# Patient Record
Sex: MALE | Race: BLACK OR AFRICAN AMERICAN
[De-identification: names, ages, dates, MRNs, and addresses within clinical notes are randomized per-mention and may not be internally consistent; named-entity substitution may affect disease eponyms.]

---

## 2017-01-21 ENCOUNTER — HOSPITAL ENCOUNTER (EMERGENCY)
Dept: HOSPITAL 75 - ER | Age: 32
Discharge: LEFT BEFORE DISCHARGE | End: 2017-01-21
Payer: SELF-PAY

## 2017-01-21 VITALS — WEIGHT: 190 LBS | BODY MASS INDEX: 29.82 KG/M2 | HEIGHT: 67 IN

## 2017-01-21 VITALS — DIASTOLIC BLOOD PRESSURE: 122 MMHG | SYSTOLIC BLOOD PRESSURE: 149 MMHG

## 2017-01-21 DIAGNOSIS — Z53.21: ICD-10-CM

## 2017-01-21 DIAGNOSIS — R07.89: Primary | ICD-10-CM

## 2017-01-21 PROCEDURE — 99281 EMR DPT VST MAYX REQ PHY/QHP: CPT

## 2017-01-24 NOTE — ED CHEST PAIN
General


Chief Complaint:  Chest Wall/Rib Pain


Stated Complaint:  SOA/ABD AND CHEST PAIN


Nursing Triage Note:  


Amb to Rm 7 leaning on significant other, crying anf refusing to answer 


questions over girlfriend.  c/o pain in rib area.  Girlfriend states he has 

been 


coughing over last 3 days.


Nursing Sepsis Screen:  No Definite Risk


Source:  patient (EXTREMELY DIFFICULT HISTORIAN AND INITIALLY REFUSED TO ANSWER 

ANY QUESTIONS AT ALL), other (GIRLFRIEND--VERY LIMITED HISTORIAN--CAN GIVE NO 

RELEVANT INFORMATION)





History of Present Illness


Time seen by provider:  17:58


Initial Comments


PT C/O PAIN TO LEFT LOWER RIB AREA SINCE YESTERDAY, WORSE TODAY


HURTS TO BREATHE


HAS HAD PRODUCTIVE COUGH WITH YELLOW SPUTUM SINCE THURSDAY  NIGHT 01/19/17


UNKNOWN IF HE HAS HAD FEVER OR NOT


HAS TAKEN NOTHING FOR SYMPTOMS





NO OTHER INFORMATION IS OBTAINABLE FROM PT





Allergies and Home Medications


Allergies


Coded Allergies:  


     No Known Drug Allergies (Unverified , 3/23/16)





Review of Systems


Constitutional:   other (MINIMAL INFORMATION OBTAINED FROM PT)


Respiratory:   Cough


Cardiovascular:   Chest Pain





Past Medical-Social-Family Hx


Patient Social History


Alcohol Use:  Occasionally Uses


Recreational Drug Use:  Yes (THC, COCAINE. DEINES IV USE)


Smoking Status:  Current Everyday Smoker (1/2 PPD)


Type Used:  Cigarettes


Recent Foreign Travel:  No


Contact w/Someone Who Travel:  No


Recent Infectious Disease Expo:  No


Recent Hopitalizations:  No


Physical Abuse Screen:  No


Sexual Abuse:  No





Seasonal Allergies


Seasonal Allergies:  No





Surgeries


HX Surgeries:  Yes (RIGHT KNEE )


Surgeries:  Orthopedic





Respiratory


Hx Respiratory Disorders:  Yes (childhood asthma)


Respiratory Disorders:  Asthma





Cardiovascular


Hx Cardiac Disorders:  Yes (REPORTS DX WHILE IN senior living.  DOES NOT TAKE MEDS)


Cardiac Disorders:  Hypertension





Neurological


Hx Neurological Disorders:  Yes (none since 17 y/o)


Neurological Disorders:  Seizure Disorder





Reproductive System


Hx Reproductive Disorders:  No


Sexually Transmitted Disease:  No


HIV/AIDS:  No





Genitourinary


Hx Genitourinary Disorders:  No





Gastrointestinal


Hx Gastrointestinal Disorders:  No





Musculoskeletal


Hx Musculoskeletal Disorders:  Yes


Musculoskeletal Disorders:  Chronic Back Pain





Endocrine


Hx Endocrine Disorders:  No





HEENT


HX ENT Disorders:  No





Cancer


Hx Cancer:  No





Psychosocial


Hx Psychiatric Problems:  No





Integumentary


HX Skin/Integumentary Disorder:  No





Blood Transfusions


Hx Blood Disorders:  No





Family Medical History


Significant Family History:  No Pertinent Family Hx





Physical Exam


Vital Signs





 Vital Sign - Last 12Hours








 1/21/17





 18:54


 


Temp 98.3


 


Pulse 94


 


Resp 24


 


B/P 149/122


 


Pulse Ox 98


 


O2 Delivery Room Air





Capillary Refill : Less Than 3 Seconds


General Appearance:   Other (PT EXTREMELY DRAMATIC--WAILING, CRYING, MOANING. 

DRAMATICALL PANTING, TALKING IN 1-2 WORD SENTENCES. --ALL THS BEHAVIOR STOPS 

WHEN DISTRACTED.     PT REFUSES TO ALLOW ME TO EXAMINE HIM--PUSHES ME /

STETHOSCOPE AWAY WITH ATTEMPT TO AUSCULTATE LUNG SOUNDS, EVEN BEFORE HE IS 

TOUCHED. )





Progress/Results/Core Measures


Results/Orders


Vital Signs/I&O





 Vital Sign - Last 12Hours








 1/21/17 1/21/17





 18:54 19:18


 


Temp 98.3 98.3


 


Pulse 94 94


 


Resp 24 24


 


B/P 149/122 


 


Pulse Ox 98 98


 


O2 Delivery Room Air 








Blood Pressure Mean:  131


Progress Note :  


Progress Note


PT REFUSING EXAM, IV AND ALL TESTS AND STORMED OUT OF ER AT 1918, REFUSING TO 

SIGN AMA FORMS.





Departure


Impression


Impression:  


 Primary Impression:  


 Left against medical advice


Disposition:  07 AGAINST MEDICAL ADVICE


Condition:  Against Medical Advice





Departure-Patient Inst.


Referrals:  


NO,LOCAL PHYSICIAN (PCP)


Primary Care Physician








MYRON CAGE DO Jan 24, 2017 02:54

## 2017-02-17 ENCOUNTER — HOSPITAL ENCOUNTER (EMERGENCY)
Dept: HOSPITAL 75 - ER | Age: 32
Discharge: HOME | End: 2017-02-17
Payer: SELF-PAY

## 2017-02-17 VITALS — WEIGHT: 190 LBS | BODY MASS INDEX: 29.82 KG/M2 | HEIGHT: 67 IN

## 2017-02-17 VITALS — SYSTOLIC BLOOD PRESSURE: 150 MMHG | DIASTOLIC BLOOD PRESSURE: 70 MMHG

## 2017-02-17 DIAGNOSIS — R30.0: Primary | ICD-10-CM

## 2017-02-17 DIAGNOSIS — F17.210: ICD-10-CM

## 2017-02-17 DIAGNOSIS — R36.9: ICD-10-CM

## 2017-02-17 LAB
BILIRUB UR QL STRIP: NEGATIVE
HYALINE CASTS #/AREA URNS LPF: (no result) /LPF
KETONES UR QL STRIP: NEGATIVE
LEUKOCYTE ESTERASE UR QL STRIP: (no result)
NITRITE UR QL STRIP: NEGATIVE
PH UR STRIP: 8 [PH] (ref 5–9)
PROT UR QL STRIP: (no result)
SP GR UR STRIP: 1.01 (ref 1.02–1.02)
SQUAMOUS #/AREA URNS HPF: (no result) /HPF
UROBILINOGEN UR-MCNC: 1 MG/DL
WBC #/AREA URNS HPF: (no result) /HPF

## 2017-02-17 PROCEDURE — 86780 TREPONEMA PALLIDUM: CPT

## 2017-02-17 PROCEDURE — 99283 EMERGENCY DEPT VISIT LOW MDM: CPT

## 2017-02-17 PROCEDURE — 81000 URINALYSIS NONAUTO W/SCOPE: CPT

## 2017-02-17 PROCEDURE — 87591 N.GONORRHOEAE DNA AMP PROB: CPT

## 2017-02-17 PROCEDURE — 96372 THER/PROPH/DIAG INJ SC/IM: CPT

## 2017-02-17 PROCEDURE — 87491 CHLMYD TRACH DNA AMP PROBE: CPT

## 2017-02-17 PROCEDURE — 36415 COLL VENOUS BLD VENIPUNCTURE: CPT

## 2017-02-17 NOTE — ED GU-MALE
General


Chief Complaint:  -Male


Stated Complaint:  STD CHECK


Nursing Triage Note:  


States for 1 week has had "gooey yellow" discharge, itching, blisters, rash 

pain 


with urination.


Source:  patient


Exam Limitations:  no limitations





History of Present Illness


Time seen by provider:  12:10


Initial Comments


To ER with a one-week history of "gooey yellow" discharge from the penis.  He 

also reports blisters on the shaft of the penis that have turned into scabs they

're not painful.  Pain is a very sharp pain with urination.  No systemic 

symptoms such as flulike symptoms, fevers.  States that he is only sexually 

active with his wife


Timing/Duration:  just prior to arrival


Severity/Quality:  moderate


Radiation:  none


Prior Genitourinary Problems:  none


Associated Symptoms:   dysuria





Allergies and Home Medications


Allergies


Coded Allergies:  


     No Known Drug Allergies (Unverified , 2/17/17)





Constitutional:   see HPI


EENTM:   see HPI


Respiratory:   no symptoms reported


Cardiovascular:   no symptoms reported


Genitourinary:   see HPI


Musculoskeletal:   no symptoms reported


Skin:   no symptoms reported


Psychiatric/Neurological:   No Symptoms Reported


Endocrine:   No Symptoms Reported


Hematologic/Lymphatic:   No Symptoms Reported





Past Medical-Social-Family Hx


Patient Social History


Alcohol Use:  Denies Use


Recreational Drug Use:  Yes (POT)


Type Used:  Cigarettes


2nd Hand Smoke Exposure:  Yes


Recent Foreign Travel:  No


Contact w/Someone Who Travel:  No


Recent Infectious Disease Expo:  No


Recent Hopitalizations:  Yes (Seizures 2/1/17- [employer])





Immunizations Up To Date


Tetanus Booster (TDap):  Unknown





Seasonal Allergies


Seasonal Allergies:  No





Surgeries


HX Surgeries:  Yes (RIGHT KNEE )


Surgeries:  Orthopedic





Respiratory


Hx Respiratory Disorders:  Yes (childhood asthma)


Respiratory Disorders:  Asthma





Cardiovascular


Hx Cardiac Disorders:  Yes (REPORTS DX WHILE IN [facility].  DOES NOT TAKE MEDS)


Cardiac Disorders:  Hypertension





Neurological


Hx Neurological Disorders:  Yes (none since 15 y/o)


Neurological Disorders:  Seizure Disorder





Reproductive System


Hx Reproductive Disorders:  No


Sexually Transmitted Disease:  No


HIV/AIDS:  No





Genitourinary


Hx Genitourinary Disorders:  No





Gastrointestinal


Hx Gastrointestinal Disorders:  No





Musculoskeletal


Hx Musculoskeletal Disorders:  Yes


Musculoskeletal Disorders:  Chronic Back Pain





Endocrine


Hx Endocrine Disorders:  No





HEENT


HX ENT Disorders:  No





Cancer


Hx Cancer:  No





Psychosocial


Hx Psychiatric Problems:  No





Integumentary


HX Skin/Integumentary Disorder:  No





Blood Transfusions


Hx Blood Disorders:  No





Family Medical History


Significant Family History:  No Pertinent Family Hx





Physical Exam


Vital Signs





 Vital Sign - Last 12Hours








 2/17/17





 12:03


 


Temp 98.0


 


Pulse 82


 


Resp 18


 


B/P 156/130


 


Pulse Ox 100





Capillary Refill : Less Than 3 Seconds


General Appearance:   WD/WN no apparent distress


HEENT:   PERRL/EOMI normal ENT inspection


Neck:   non-tender full range of motion


Respiratory:   no respiratory distress no accessory muscle use


Gastrointestinal:   non tender soft


Male:  No testicular tenderness,  other (there is 1 small scab about 4 mm the 

dorsal shaft of the penis without induration, no eschar or vesicles.  

Nontender.  No active drainage from the urethra)


Extremities:   normal range of motion non-tender


Neurologic/Psychiatric:   alert normal mood/affect oriented x 3


Skin:   normal color warm/dry





Progress/Results/Core Measures


Results/Orders


My Orders





 Orders-KENNETH DESOUZA


Chlamydia Dna Urine Test (2/17/17 12:08)


Neis Vance Dna Urine Test (2/17/17 12:08)


Ua Culture If Indicated (2/17/17 12:08)





Vital Signs/I&O





 Vital Sign - Last 12Hours








 2/17/17





 12:03


 


Temp 98.0


 


Pulse 82


 


Resp 18


 


B/P 156/130


 


Pulse Ox 100








Blood Pressure Mean:  139





Departure


Impression


Impression:  


 Primary Impression:  


 Dysuria


Disposition:  01 HOME, SELF-CARE


Condition:  Stable





Departure-Patient Inst.


Decision time for Depature:  12:12


Referrals:  


NO,LOCAL PHYSICIAN (PCP/Family)


Primary Care Physician


Patient Instructions:  NO INSTRUCTIONS GIVEN





Add. Discharge Instructions:


1.  Follow-up with your regular doctor next week


2.  Return to ER for any concerns


3.  Buttocks as directed


4.  Your sexual partner should also be checked and tested.  No sexual 

intercourse until this occurs.  Your culture results will be back in about 1 

week and this will tell us if this is an STD.  





All discharge instructions reviewed with patient and/or family. Voiced 

understanding.








KENNETH DESOUZA Feb 17, 2017 12:12

## 2017-02-17 NOTE — XMS REPORT
Continuity of Care Document

 Created on: 2017



RUSTY SAMUEL

External Reference #: [id number]

: 1985

Sex: Male



Demographics







 Address  [address] [place] [address]  [address]

 

 Home Phone  [phone]

 

 Preferred Language  Unknown

 

 Marital Status  Unknown

 

 Latter-day Affiliation  Unknown

 

 Race  Unknown

 

 Ethnic Group  Unknown





Author







 Author  [facility]

 

 Organization  [facility]

 

 Address  Unknown

 

 Phone  Unavailable



                                                      



Allergies

                      





 Active                    Description                    Code                 
   Type                    Severity                    Reaction                
    Onset                    Reported/Identified                    
Relationship to Patient                    Clinical Status                

 

 Yes                    No Known Drug Allergies                    [id number]  
                  Drug Allergy                    Unknown                    N/
A                                         2016                           
                               



                                                                               
         



Medications

                                                                               
         



Problems

                      





 Date Dx Coded                    Attending                    Type            
        Code                    Diagnosis                    Diagnosed By      
          

 

 2016                    MONAE GAVIN                    Ot     
               F17.210                    NICOTINE DEPENDENCE, CIGARETTES, 
UNCOMPL                                     

 

 2016                    MONAE GAVIN                    Ot     
               L72.3                    SEBACEOUS CYST                         
            

 

 2016                    MONAE GAVIN                    Ot     
               F17.210                                                          

 

 2016                    MONAE GAVIN                    Ot     
               L72.3                                                          

 

 2016                    LYNDON MARSH DO                    Ot      
              H10.32                    UNSPECIFIED ACUTE CONJUNCTIVITIS, LEFT 
E                                     

 

 2016                    LYNDON MARSH DO                    Ot      
              H57.9                    UNSPECIFIED DISORDER OF EYE AND ADNEXA  
                                   

 

 08/10/2016                    LYNDNO MARSH DO                    Ot      
              H10.32                    UNSPECIFIED ACUTE CONJUNCTIVITIS, LEFT 
E                                     

 

 08/10/2016                    LYNDON MARSH DO                    Ot      
              H57.9                    UNSPECIFIED DISORDER OF EYE AND ADNEXA  
                                   



                                                                               
                                                                               



Procedures

                                                                               
         



Results

                                                                    



Encounters

                      





 ACCT No.                    Visit Date/Time                    Discharge      
              Status                    Pt. Type                    Provider   
                 Facility                    Loc./Unit                    
Complaint                

 

 [id number]                    2017 18:47:00                    2017 19:18:00                    DIS                    Emergency              
      MYRON CAGE DO                    [facility]      
              ER                    SOA/ABD AND CHEST PAIN                

 

 [id number]                    2016 20:05:00                    2016 20:40:00                    DIS                    Emergency              
      LYNDON MARSH DO                    [facility] 
                   ER                    L EYE BLURRED VISION/PAIN             
   

 

 [id number]                    2016 13:36:00                    2016 15:07:00                    DIS                    Emergency              
      MONAE GAVIN                    [facility]
                    ER

## 2017-07-09 ENCOUNTER — HOSPITAL ENCOUNTER (EMERGENCY)
Dept: HOSPITAL 75 - ER | Age: 32
Discharge: HOME | End: 2017-07-09
Payer: SELF-PAY

## 2017-07-09 VITALS — HEIGHT: 67 IN | WEIGHT: 200 LBS | BODY MASS INDEX: 31.39 KG/M2

## 2017-07-09 VITALS — SYSTOLIC BLOOD PRESSURE: 152 MMHG | DIASTOLIC BLOOD PRESSURE: 94 MMHG

## 2017-07-09 LAB
BILIRUB UR QL STRIP: NEGATIVE
KETONES UR QL STRIP: NEGATIVE
LEUKOCYTE ESTERASE UR QL STRIP: (no result)
NITRITE UR QL STRIP: NEGATIVE
PH UR STRIP: 6.5 [PH] (ref 5–9)
PROT UR QL STRIP: NEGATIVE
SP GR UR STRIP: 1.01 (ref 1.02–1.02)
UROBILINOGEN UR-MCNC: NORMAL MG/DL
WBC #/AREA URNS HPF: (no result) /HPF

## 2017-07-09 PROCEDURE — 76870 US EXAM SCROTUM: CPT

## 2017-07-09 PROCEDURE — 99283 EMERGENCY DEPT VISIT LOW MDM: CPT

## 2017-07-09 PROCEDURE — 81000 URINALYSIS NONAUTO W/SCOPE: CPT

## 2017-07-09 NOTE — ED GU-MALE
General


Chief Complaint:  -Male


Stated Complaint:  R SIDE TESTICLE PAIN


Nursing Triage Note:  


c/o right testicle pain x 3 days. Pain radiates into groin.


Source:  patient


Exam Limitations:  no limitations





History of Present Illness


Time seen by provider:  16:25


Initial Comments


Here with report of to 3 days of right testicular pain that has worsened 

overnight with some associated swelling.  Denies recent injury.  Does have pain 

with erection.  Denies dysuria.  Denies penile discharge.


Timing/Duration:  getting worse


Severity/Quality:  moderate, aching


Location:  scrotal


Radiation:  suprapubic


Activities at Onset:  none


Sexual [place] History:  less than 2 months ago, single partner


Associated Symptoms:  No abdominal pain, No dysuria, No fever/chills, No lower 

back pain, No nausea/vomiting, No urinary frequency





Allergies and Home Medications


Allergies


Coded Allergies:  


     No Known Drug Allergies (Unverified , 2/17/17)





Home Medications


No Active Prescriptions or Reported Meds





Constitutional:  no symptoms reported, see HPI


Respiratory:  no symptoms reported


Cardiovascular:  no symptoms reported


Gastrointestinal:  see HPI, No nausea, No vomiting


Genitourinary:  see HPI, denies discharge, denies dysuria


Musculoskeletal:  no symptoms reported


Skin:  no symptoms reported





Past Medical-Social-Family Hx


Patient Social History


Alcohol Use:  Denies Use


Recreational Drug Use:  Yes


Drug of Choice:  marijuana


Smoking Status:  Never a Smoker


Type Used:  Cigarettes


2nd Hand Smoke Exposure:  Yes


Recent Foreign Travel:  No


Contact w/Someone Who Travel:  No


Recent Infectious Disease Expo:  No


Recent Hopitalizations:  Yes (Seizures 2/1/17- [facility] [place])





Immunizations Up To Date


Tetanus Booster (TDap):  Unknown





Seasonal Allergies


Seasonal Allergies:  No





Surgeries


HX Surgeries:  Yes (RIGHT KNEE )


Surgeries:  Orthopedic





Respiratory


Hx Respiratory Disorders:  Yes (childhood asthma)


Respiratory Disorders:  Asthma





Cardiovascular


Hx Cardiac Disorders:  Yes (REPORTS DX WHILE IN [facility].  DOES NOT TAKE MEDS)


Cardiac Disorders:  Hypertension





Neurological


Hx Neurological Disorders:  Yes (none since 15 y/o)


Neurological Disorders:  Seizure Disorder





Reproductive System


Hx Reproductive Disorders:  No


Sexually Transmitted Disease:  No


HIV/AIDS:  No





Genitourinary


Hx Genitourinary Disorders:  No





Gastrointestinal


Hx Gastrointestinal Disorders:  No





Musculoskeletal


Hx Musculoskeletal Disorders:  Yes


Musculoskeletal Disorders:  Chronic Back Pain





Endocrine


Hx Endocrine Disorders:  No





HEENT


HX ENT Disorders:  No





Cancer


Hx Cancer:  No





Psychosocial


Hx Psychiatric Problems:  No





Integumentary


HX Skin/Integumentary Disorder:  No





Blood Transfusions


Hx Blood Disorders:  No





Reviewed Nursing Assessment


Reviewed/Agree w Nursing PMH:  Yes





Family Medical History


Significant Family History:  No Pertinent Family Hx





Physical Exam


Vital Signs





Vital Sign - Last 12Hours








 7/9/17





 16:29


 


Temp 97.5


 


Pulse 74


 


Resp 16


 


B/P (MAP) 160/121


 


Pulse Ox 98





Capillary Refill : Less Than 3 Seconds


General Appearance:  WD/WN, no apparent distress


HEENT:  PERRL/EOMI, pharynx normal


Neck:  full range of motion, supple


Cardiovascular:  regular rate, rhythm, no murmur


Respiratory:  lungs clear, normal breath sounds


Gastrointestinal:  non tender, soft


Male:  no hernia, No erythema, testicular tenderness (right sided with mild 

swelling noted.)


Back:  normal inspection, no CVA tenderness, no vertebral tenderness


Extremities:  non-tender, normal inspection


Neurologic/Psychiatric:  alert, oriented x 3


Skin:  normal color, warm/dry





Progress/Results/Core Measures


Results/Orders


Lab Results





Laboratory Tests








Test


  7/9/17


16:30 Range/Units


 


 


Urine Color YELLOW   


 


Urine Clarity CLEAR   


 


Urine pH 6.5  5-9  


 


Urine Specific Gravity 1.015 L 1.016-1.022  


 


Urine Protein NEGATIVE  NEGATIVE  


 


Urine Glucose (UA) NEGATIVE  NEGATIVE  


 


Urine Ketones NEGATIVE  NEGATIVE  


 


Urine Nitrite NEGATIVE  NEGATIVE  


 


Urine Bilirubin NEGATIVE  NEGATIVE  


 


Urine Urobilinogen NORMAL  NORMAL  MG/DL


 


Urine Leukocyte Esterase 1+ H NEGATIVE  


 


Urine RBC (Auto) NEGATIVE  NEGATIVE  


 


Urine RBC NONE   /HPF


 


Urine WBC 2-5   /HPF


 


Urine Crystals NONE   /LPF


 


Urine Bacteria NEGATIVE   /HPF


 


Urine Casts NONE   /LPF


 


Urine Mucus NEGATIVE   /LPF


 


Urine Culture Indicated NO   








My Orders





Orders - ALTAGRACIA TAN MD


Ua Culture If Indicated (7/9/17 16:27)


Us Scrotum (Testicle) [id number] (7/9/17 16:27)


Ibuprofen  Tablet (Motrin  Tablet) (7/9/17 16:27)


Chlamydia Dna (7/9/17 17:25)


Neis Vance Dna Urine Test (7/9/17 17:25)





Vital Signs/I&O





Vital Sign - Last 12Hours








 7/9/17 7/9/17





 16:29 16:42


 


Temp 97.5 97.5


 


Pulse 74 


 


Resp 16 


 


B/P (MAP) 160/121 


 


Pulse Ox 98 














Blood Pressure Mean:  134








Progress Note :  


Progress Note


Seen and evaluated.  UA ordered.  Ultrasound right testicle.  Ibuprofen and 800 

mg by mouth given.  Monitor patient.  1728: Question of mild right epididymis 

swelling on ultrasound.  We will treat presumptively for epididymitis.  

Chlamydia and gonorrhea DNA testing and urine ordered.  Patient does describe 

that the onset of pain was during sexual activity and this may be related to 

mild testicular trauma but no torsion noted.  Levaquin 500 mg by mouth given.  

Discharged home with return precautions.  Patient verbalize understanding 

instructions and agreement with plan.





Diagnostic Imaging





   Diagonstic Imaging:  Ultrasound


   Plain Films/CT/US/NM/MRI:  other (scrotum)


Comments


Mild right epididymis swelling no torsion.





Departure


Impression


Impression:  


 Primary Impression:  


 Epididymitis, right


Disposition:  01 HOME, SELF-CARE


Condition:  Improved





Departure-Patient Inst.


Decision time for Depature:  17:37


Referrals:  


NO,LOCAL PHYSICIAN (PCP/Family)


Primary Care Physician


Patient Instructions:  Epididymitis (DC)





Add. Discharge Instructions:  


All discharge instructions reviewed with patient and/or family. Voiced 

understanding.





Take medications as directed.  Follow-up with your Dr. in a few days for 

recheck.  Return for worse pain, fever, vomiting, weakness, breathing problems 

or other concerns as needed.  You may take ibuprofen 800 mg every 8 hours as 

needed for pain.  You may take Tylenol 1000 mg every 8 hours as needed for 

pain.  Drink plenty of fluids.


Scripts


Ciprofloxacin HCl (Ciprofloxacin HCl) 500 Mg Tablet


500 MG PO BID, #20 TAB


   Prov: ALTAGRACIA TAN MD         7/9/17











ALTAGRACIA TAN MD Jul 9, 2017 16:45

## 2017-07-09 NOTE — DIAGNOSTIC IMAGING REPORT
INDICATION: Pain.



COMPARISON: None available.



TECHNIQUE: Scrotal ultrasound with duplex interrogation was

performed on July 9, 2017.



FINDINGS: The right testicle is within normal limits in size. It

maintains a homogeneous echotexture without intratesticular mass.

Vascular flow is seen within the right testicle.



The left testicle is within normal limits in size. It maintains a

homogeneous echotexture without intratesticular mass. Vascular

flow is seen within the right testicle.



The right epididymis is slightly enlarged when compared to the

left. Additionally, the right epididymis demonstrates increased

blood flow when compared to the left.



No significant hydrocele.



IMPRESSION: Slightly increased vascularity and prominence of the

right epididymis likely relates to epididymitis. Otherwise,

unremarkable. 



Dictated by: 



  Dictated on workstation # [id number]

## 2017-07-11 NOTE — XMS REPORT
Continuity of Care Document

 Created on: 2017



MANINDER SAMUEL

External Reference #: [id number]

: 1985

Sex: Male



Demographics







 Address  [address] [place] [address]  [address]

 

 Home Phone  [phone]

 

 Preferred Language  Unknown

 

 Marital Status  Unknown

 

 Judaism Affiliation  Unknown

 

 Race  Unknown

 

 Ethnic Group  Unknown





Author







 Author  [facility]

 

 Organization  [facility]

 

 Address  Unknown

 

 Phone  Unavailable



                                                      



Allergies

                      





 Active                    Description                    Code                  
  Type                    Severity                    Reaction                  
  Onset                    Reported/Identified                    Relationship 
to Patient                    Clinical Status                

 

 Yes                    No Known Drug Allergies                    [id number]  
                  Drug Allergy                    Unknown                    N/
A                                         2017                           
                               



                                                                               
         



Medications

                                                                               
         



Problems

                      





 Date Dx Coded                    Attending                    Type            
        Code                    Diagnosis                    Diagnosed By      
          

 

 2016                    MONAE GAVIN                    Ot     
               F17.210                    NICOTINE DEPENDENCE, CIGARETTES, 
UNCOMPL                                     

 

 2016                    MONAE GAVIN                    Ot     
               L72.3                    SEBACEOUS CYST                         
            

 

 2016                    MONAE GAVIN                    Ot     
               F17.210                                                          

 

 2016                    MONAE GAVIN                    Ot     
               L72.3                                                          

 

 2016                    LYNDON MARSH DO                    Ot      
              H10.32                    UNSPECIFIED ACUTE CONJUNCTIVITIS, LEFT 
E                                     

 

 2016                    LYNDON MARSH DO                    Ot      
              H57.9                    UNSPECIFIED DISORDER OF EYE AND ADNEXA  
                                   

 

 08/10/2016                    LYNDON MARSH DO                    Ot      
              H10.32                    UNSPECIFIED ACUTE CONJUNCTIVITIS, LEFT 
E                                     

 

 08/10/2016                    LYNDON MARSH DO                    Ot      
              H57.9                    UNSPECIFIED DISORDER OF EYE AND ADNEXA  
                                   

 

 2017                    MYRON CAGE DO                    Ot           
         R07.89                    OTHER CHEST PAIN                            
         

 

 2017                    MYRON CAGE DO                    Ot           
         Z53.21                    PROC/TRTMT NOT CRD OUT D/T PT LV BEF SEE    
                                 

 

 2017                    KENNETH DESOUZA                    Ot       
             R30.0                    DYSURIA                                  
   

 

 2017                    KENNETH DESOUZA                    Ot       
             R36.9                    URETHRAL DISCHARGE, UNSPECIFIED          
                           

 

 2017                    KENNETH DESOUZA                    Ot       
             F17.210                    NICOTINE DEPENDENCE, CIGARETTES, 
UNCOMPL                                     

 

 2017                    KENNETH DESOUZA                    Ot       
             R30.0                    DYSURIA                                  
   

 

 2017                    KENNETH DESOUZA                    Ot       
             R36.9                    URETHRAL DISCHARGE, UNSPECIFIED          
                           



                                                                               
                                                                               
                                                                      



Procedures

                                                                               
         



Results

                      





 Test                    Result                    Range                









 Complete urinalysis with reflex to culture - 17 12:24                









 Urine color determination                    YELLOW                     NRG   
             

 

 Urine clarity determination                    CLEAR                     NRG  
              

 

 Urine pH measurement by test strip                    8                     5-
9                

 

 Specific gravity of urine by test strip                    1.010              
       1.016-1.022                

 

 Urine protein assay by test strip, semi-quantitative                    1+    
                 NEGATIVE                

 

 Urine glucose detection by automated test strip                    NEGATIVE   
                  NEGATIVE                

 

 Erythrocytes detection in urine sediment by light microscopy                  
  NEGATIVE                     NEGATIVE                

 

 Urine ketones detection by automated test strip                    NEGATIVE   
                  NEGATIVE                

 

 Urine nitrite detection by test strip                    NEGATIVE             
        NEGATIVE                

 

 Urine total bilirubin detection by test strip                    NEGATIVE     
                NEGATIVE                

 

 Urine urobilinogen measurement by automated test strip (mass/volume)          
          1 mg/dL                    NORMAL                

 

 Urine leukocyte esterase detection by dipstick                    1+          
           NEGATIVE                

 

 Automated urine sediment erythrocyte count by microscopy (number/high power 
field)                    NONE                     NRG                

 

 Automated urine sediment leukocyte count by microscopy (number/high power field
)                     [HPF]                    NRG                

 

 Bacteria detection in urine sediment by light microscopy                    
TRACE                     NRG                

 

 Squamous epithelial cells detection in urine sediment by light microscopy     
               RARE                     NRG                

 

 Crystals detection in urine sediment by light microscopy                    
NONE                     NRG                

 

 Casts detection in urine sediment by light microscopy                    
PRESENT                     NRG                

 

 Mucus detection in urine sediment by light microscopy                    SMALL
                     NRG                

 

 Complete urinalysis with reflex to culture                    NO              
       NRG                

 

 Hyaline casts detection in urine sediment by light microscopy                 
   0-2                     NRG                









 Chlamydia DNA amp probe, urine - 17 12:24                









 Chlamydia DNA amp probe, urine                    Negative                     
Negative                









 Urine Neisseria gonorrhoeae DNA assay - 17 12:24                









 Gonorrhea amp DNA-urine                    Negative                     
Negative                









 Serum reagin antibody assay (units/volume) by RPR - 17 12:24            
    









 Serum reagin antibody assay (units/volume) by RPR                    Non-
Reactive                     NRG                



                                                                               
                             



Encounters

                      





 ACCT No.                    Visit Date/Time                    Discharge      
              Status                    Pt. Type                    Provider   
                 Facility                    Loc./Unit                    
Complaint                

 

 [id number]                    2017 11:22:00                    2017 12:47:00                    DIS                    Outpatient             
       KENNETH DESOUZA                    [facility] 
                   ER                    STD CHECK                

 

 [id number]                    2017 18:47:00                    2017 19:18:00                    DIS                    Emergency              
      MYRON CAGE DO                    [facility]      
              ER                    SOA/ABD AND CHEST PAIN                

 

 [id number]                    2016 20:05:00                    2016 20:40:00                    DIS                    Emergency              
      LYNDON MARSH DO                    [facility] 
                   ER                    L EYE BLURRED VISION/PAIN             
   

 

 [id number]                    2016 13:36:00                    2016 15:07:00                    DIS                    Emergency              
      MORENA KNIGHT, MONAE MARTINEZ                    [facility]
                    ER

## 2020-02-07 ENCOUNTER — HOSPITAL ENCOUNTER (EMERGENCY)
Dept: HOSPITAL 75 - ER | Age: 35
End: 2020-02-07
Payer: SELF-PAY

## 2020-02-07 VITALS — WEIGHT: 219.8 LBS | HEIGHT: 66.93 IN | BODY MASS INDEX: 34.5 KG/M2

## 2020-02-07 VITALS — SYSTOLIC BLOOD PRESSURE: 137 MMHG | DIASTOLIC BLOOD PRESSURE: 85 MMHG

## 2020-02-07 DIAGNOSIS — J11.1: Primary | ICD-10-CM

## 2020-02-07 DIAGNOSIS — Z77.22: ICD-10-CM

## 2020-02-07 PROCEDURE — 87804 INFLUENZA ASSAY W/OPTIC: CPT

## 2020-02-07 PROCEDURE — 87430 STREP A AG IA: CPT

## 2020-02-07 NOTE — ED COUGH/URI
General


Chief Complaint:  Cough/Cold/Flu Symptoms


Stated Complaint:  COUGH,CONGESTION


Nursing Triage Note:  


patient states cough, fever, chest discomfort for two days.


Sepsis Screen:  No Definite Risk


Source:  patient


Exam Limitations:  no limitations





History of Present Illness


Date Seen by Provider:  2020


Time Seen by Provider:  20:30


Initial Comments


To ER with cough, chills, sore throat, chest discomfort and diarrhea for 2 days.


Timing/Duration:  constant


Severity/Quality:  dry cough


Associated Symptoms:  cough, fever/chills, nasal congestion, sore throat





Allergies and Home Medications


Allergies


Coded Allergies:  


     No Known Drug Allergies (Unverified , 17)





Home Medications


Ciprofloxacin HCl 500 Mg Tablet, 500 MG PO BID


   Prescribed by: ALTAGRACIA TAN on 17 1732





Patient Home Medication List


Home Medication List Reviewed:  Yes





Review of Systems


Review of Systems


Constitutional:  see HPI, chills, fever


EENTM:  see HPI, nose congestion, throat pain


Respiratory:  see HPI, cough


Gastrointestinal:  diarrhea


Genitourinary:  no symptoms reported


Musculoskeletal:  no symptoms reported


Skin:  no symptoms reported


Psychiatric/Neurological:  No Symptoms Reported


Hematologic/Lymphatic:  No Symptoms Reported





Past Medical-Social-Family Hx


Patient Social History


Drug of Choice:  marijuana


Type Used:  Cigarettes


2nd Hand Smoke Exposure:  Yes


Recent Foreign Travel:  No


Contact w/Someone Who Travel:  No


Recent Infectious Disease Expo:  No


Recent Hopitalizations:  Yes (Seizures 17- [employer])





Immunizations Up To Date


Tetanus Booster (TDap):  Unknown





Seasonal Allergies


Seasonal Allergies:  No





Past Medical History


Surgeries:  Yes (RIGHT KNEE )


Orthopedic


Respiratory:  Yes (childhood asthma)


Asthma


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  DOES NOT TAKE MEDS)


Hypertension


Neurological:  Yes (none since 17 y/o)


Seizure Disorder


Reproductive Disorders:  No


Sexually Transmitted Disease:  No


HIV/AIDS:  No


Gastrointestinal:  No


Musculoskeletal:  Yes


Chronic Back Pain


Endocrine:  No


Cancer:  No


Psychosocial:  No


Integumentary:  No


Blood Disorders:  No





Family Medical History


No Pertinent Family Hx





Physical Exam





Vital Signs - First Documented








 20





 19:53


 


Temp 37.2


 


Pulse 114


 


Resp 18


 


B/P (MAP) 151/93 (112)


 


Pulse Ox 94





Capillary Refill : Less Than 3 Seconds


Height: 5'7.00"


Weight: 200lbs. oz. 90.735696if; 34.00 BMI


Method:Estimated


General Appearance:  WD/WN, no apparent distress


Eyes:  Bilateral Eye Normal Inspection, Bilateral Eye PERRL, Bilateral Eye EOMI


HEENT:  PERRL/EOMI, normal ENT inspection, pharyngeal erythema


Neck:  non-tender, full range of motion, lymphadenopathy (R), lymphadenopathy 

(L)


Respiratory:  no respiratory distress, no accessory muscle use


Gastrointestinal:  normal bowel sounds, non tender, soft


Extremities:  normal range of motion, non-tender


Neurologic/Psychiatric:  alert, normal mood/affect, oriented x 3


Skin:  normal color, warm/dry





Progress/Results/Core Measures


Suspected Sepsis


Recent Fever Within 48 Hours:  Yes


Infection Criteria Present:  None


New/Unexplained  Altered Menta:  No


Sepsis Screen:  No Definite Risk


SIRS


Temperature: 


Pulse: 114 


Respiratory Rate: 18


 


Blood Pressure 151 /93 


Mean: 112





Results/Orders


Vital Signs/I&O











 20





 19:53


 


Temp 37.2


 


Pulse 114


 


Resp 18


 


B/P (MAP) 151/93 (112)


 


Pulse Ox 94





Capillary Refill : Less Than 3 Seconds








Blood Pressure Mean:                    112











Departure


Impression





   Primary Impression:  


   Influenza


Disposition:  20 


Condition:  Stable





Departure-Patient Inst.


Decision time for Depature:  20:31


Referrals:  


NO,LOCAL PHYSICIAN (PCP)


Primary Care Physician


Patient Instructions:  Flu, Bacterial Upper Respiratory Infection, Adult (DC)





Add. Discharge Instructions:  


1. Tylenol and ibuprofen for pain or fever control. You can use NyQuil and 

DayQuil as needed for symptom control. Drink plenty of fluids to stay hydrated. 

Unfortunately symptoms may persist for up to 10 days.


Work/School Note:  Work Release Form   Date Seen in the Emergency Department:  

2020


   Return to Work:  2020











KENNETH DESOUZA              2020 20:33

## 2020-04-14 ENCOUNTER — HOSPITAL ENCOUNTER (EMERGENCY)
Dept: HOSPITAL 75 - ER | Age: 35
Discharge: HOME | End: 2020-04-14
Payer: SELF-PAY

## 2020-04-14 VITALS — WEIGHT: 224.87 LBS | HEIGHT: 66.93 IN | BODY MASS INDEX: 35.29 KG/M2

## 2020-04-14 VITALS — DIASTOLIC BLOOD PRESSURE: 109 MMHG | SYSTOLIC BLOOD PRESSURE: 168 MMHG

## 2020-04-14 DIAGNOSIS — J45.909: ICD-10-CM

## 2020-04-14 DIAGNOSIS — F17.210: ICD-10-CM

## 2020-04-14 DIAGNOSIS — R07.81: Primary | ICD-10-CM

## 2020-04-14 PROCEDURE — 71045 X-RAY EXAM CHEST 1 VIEW: CPT

## 2020-04-14 NOTE — ED CHEST PAIN
General


Stated Complaint:  SOB,SHARP PAIN LEFT


Source:  patient


Exam Limitations:  no limitations





History of Present Illness


Date Seen by Provider:  Apr 14, 2020


Time Seen by Provider:  16:59


Initial Comments


to ER with sharp left-sided chest pain onset 2 days ago no cough no fever but he

has had shortness of breath. History of asthma. Pain is worsened by deep 

breathing or movement. Tender to touch as well. No preceding injury. Smokes one 

half pack of cigarettes per day.


Timing/Duration:  1-2 days


Severity/Quality:  moderate


Location:  other


Radiation:  no radiation


Activities at Onset:  none


Modifying Factors:  worse with movement


ASA po PTA:  No


NTG SL PTA:  No


Associated Symptoms:  shortness of breath





Allergies and Home Medications


Allergies


Coded Allergies:  


     No Known Drug Allergies (Unverified , 2/17/17)





Home Medications


Ciprofloxacin HCl 500 Mg Tablet, 500 MG PO BID


   Prescribed by: ALTAGRACIA TAN on 7/9/17 1738


Naproxen 500 Mg Tablet, 500 MG PO BID PRN for PAIN-SEVERE (8-10)


   Prescribed by: KENNETH DESOUZA on 4/14/20 1724





Patient Home Medication List


Home Medication List Reviewed:  Yes





Review of Systems


Review of Systems


Constitutional:  see HPI


EENTM:  No Symptoms Reported


Respiratory:  See HPI; Denies Cough; Shortness of Air


Cardiovascular:  See HPI, Chest Pain


Gastrointestinal:  No Symptoms Reported


Genitourinary:  No Symptoms Reported


Musculoskeletal:  no symptoms reported


Skin:  no symptoms reported


Psychiatric/Neurological:  No Symptoms Reported


Endocrine:  No Symptoms Reported


Hematologic/Lymphatic:  No Symptoms Reported





Physical Exam


Vital Signs





Vital Signs - First Documented








 4/14/20





 17:00


 


Temp 36.6


 


Pulse 115


 


Resp 24


 


B/P (MAP) 168/109 (128)


 


Pulse Ox 98


 


O2 Delivery Room Air





Capillary Refill :


Height, Weight, BMI


Height: '"


Weight: lbs. oz. kg;  BMI


Method:


General Appearance:  WD/WN, Mild Distress


HEENT:  PERRL/EOMI, Normal ENT Inspection


Neck:  Full Range of Motion, Normal Inspection


Respiratory:  No Accessory Muscle Use, No Respiratory Distress, Other (left 

lateral chest ttp)


Cardiovascular:  Normal Peripheral Pulses, Tachycardia


Gastrointestinal:  Normal Bowel Sounds, Non Tender, Soft


Neurologic/Psychiatric:  Alert, Oriented x3


Skin:  Normal Color





Progress/Results/Core Measures


Results/Orders


My Orders





Orders - KENNETH DESOUZA


Chest 1 View, Ap/Pa Only (4/14/20 16:58)


Hydrocodone/Apap 5/325 Tablet (Lortab 5 (4/14/20 17:00)


Ketorolac Injection (Toradol Injection) (4/14/20 17:00)


Ketorolac Injection (Toradol Injection) (4/14/20 17:15)


Ketorolac Injection (Toradol Injection) (4/14/20 17:08)





Medications Given in ED





Current Medications








 Medications  Dose


 Ordered  Sig/Prem


 Route  Start Time


 Stop Time Status Last Admin


Dose Admin


 


 Acetaminophen/


 Hydrocodone Bitart  1 tab  ONCE  ONCE


 PO  4/14/20 17:00


 4/14/20 17:01 DC 4/14/20 17:17


1 TAB


 


 Ketorolac


 Tromethamine  30 mg  ONCE  ONCE


 IM  4/14/20 17:15


 4/14/20 17:16 DC 4/14/20 17:18


30 MG








Vital Signs/I&O











 4/14/20





 17:00


 


Temp 36.6


 


Pulse 115


 


Resp 24


 


B/P (MAP) 168/109 (128)


 


Pulse Ox 98


 


O2 Delivery Room Air











Departure


Communication (Admissions)


Offered additional workup to include labs +/- CT to help eval for other causes 

of pain such as PE, pt declines further workup at this time.





Impression





   Primary Impression:  


   Pleuritic chest pain


Disposition:  01 HOME, SELF-CARE


Condition:  Stable





Departure-Patient Inst.


Decision time for Depature:  17:23


Patient Instructions:  Pleuritic Chest Pain





Add. Discharge Instructions:  


1.Return to ER for any concerns


2 Follow up with your doctor next week


3.Medication as directed


Scripts


Naproxen (Naprosyn) 500 Mg Tablet


500 MG PO BID PRN for PAIN-SEVERE (8-10), #30 TAB 0 Refills


   Prov: KENNETH DESOUZA APRN         4/14/20











KENNETH DESOUZA APRN             Apr 14, 2020 17:01

## 2020-04-14 NOTE — DIAGNOSTIC IMAGING REPORT
Indication: Shortness of breath.



Time of Exam: 5:30 PM



No prior studies are available for comparison.



The heart size is normal. The pulmonary vascularity is

unremarkable. The lungs are clear. No infiltrate, effusion or

pneumothorax is detected.



Impression: No acute cardiopulmonary process is detected.



Dictated by: 



  Dictated on workstation # [id number]

## 2020-06-13 ENCOUNTER — HOSPITAL ENCOUNTER (EMERGENCY)
Dept: HOSPITAL 75 - ER | Age: 35
LOS: 1 days | Discharge: HOME | End: 2020-06-14
Payer: SELF-PAY

## 2020-06-13 VITALS — HEIGHT: 66.93 IN | BODY MASS INDEX: 36.51 KG/M2 | WEIGHT: 232.59 LBS

## 2020-06-13 DIAGNOSIS — I10: Primary | ICD-10-CM

## 2020-06-13 DIAGNOSIS — F17.210: ICD-10-CM

## 2020-06-13 LAB
APTT PPP: YELLOW S
BILIRUB UR QL STRIP: NEGATIVE
FIBRINOGEN PPP-MCNC: CLEAR MG/DL
GLUCOSE UR STRIP-MCNC: NEGATIVE MG/DL
KETONES UR QL STRIP: NEGATIVE
LEUKOCYTE ESTERASE UR QL STRIP: NEGATIVE
NITRITE UR QL STRIP: NEGATIVE
PH UR STRIP: 7 [PH] (ref 5–9)
PROT UR QL STRIP: NEGATIVE
SP GR UR STRIP: 1.02 (ref 1.02–1.02)

## 2020-06-13 PROCEDURE — 99283 EMERGENCY DEPT VISIT LOW MDM: CPT

## 2020-06-13 PROCEDURE — 81000 URINALYSIS NONAUTO W/SCOPE: CPT

## 2020-06-14 VITALS — SYSTOLIC BLOOD PRESSURE: 147 MMHG | DIASTOLIC BLOOD PRESSURE: 103 MMHG

## 2020-06-14 LAB
BACTERIA #/AREA URNS HPF: NEGATIVE /HPF
RBC #/AREA URNS HPF: (no result) /HPF
SQUAMOUS #/AREA URNS HPF: (no result) /HPF
WBC #/AREA URNS HPF: (no result) /HPF

## 2020-06-14 NOTE — ED GENERAL
General


Chief Complaint:  General Problems/Pain


Stated Complaint:  COUGHING UP BLOOD, MIGRAINE, NOSEBLEEDS,


Nursing Triage Note:  


c/o blood in urine 6/9/20, intermittant nose bleeding, back pain from "tumor"


Nursing Sepsis Screen:  No Definite Risk


Source of Information:  Patient


Exam Limitations:  No Limitations





History of Present Illness


Date Seen by Provider:  Jun 13, 2020


Time Seen by Provider:  23:44


Initial Comments


This 35-year-old gentleman presents to the emergency room with primary complaint

of headache and hypertension.  He also noted some blood in his urine several 

days ago.  He reports frequent headaches in recent weeks.  He is also notably 

hypertensive.  I reviewed his record from prior visits and he seems to be 

hypertensive on those visits as well.  He has not yet been treated for his 

hypertension.  He denies any use of stimulants or other substances that could've

exacerbated his blood pressure.  He makes no mention of coughing up blood to 

this provider and he has no chest pain, fever, or shortness of breath.  He does 

have 2 lumps on his back suspicious for lipomas.  He has had no acute change in 

these and has a pending surgical consult so.  He expresses minimal concerned 

about these things but presented to the emergency room at the urging of his 

significant other.





Allergies and Home Medications


Allergies


Coded Allergies:  


     No Known Drug Allergies (Unverified , 2/17/17)





Home Medications


Lisinopril/Hydrochlorothiazide 1 Each Tablet, 1 EACH PO DAILY


   Prescribed by: KENNETH BOYCE on 6/14/20 0030





Patient Home Medication List


Home Medication List Reviewed:  Yes





Review of Systems


Review of Systems


Constitutional:  no symptoms reported


EENTM:  no symptoms reported


Respiratory:  no symptoms reported


Cardiovascular:  see HPI


Gastrointestinal:  no symptoms reported


Genitourinary:  see HPI


Musculoskeletal:  no symptoms reported


Skin:  no symptoms reported


Psychiatric/Neurological:  No Symptoms Reported


Hematologic/Lymphatic:  No Symptoms Reported





Past Medical-Social-Family Hx


Past Med/Social Hx:  Reviewed Nursing Past Med/Soc Hx


Patient Social History


Alcohol Use:  Occasionally Uses


Alcohol Beverage of Choice:  Beer


Recreational Drug Use:  Yes


Drug of Choice:  cocaine


Smoking Status:  Current Everyday Smoker


Type Used:  Cigarettes


2nd Hand Smoke Exposure:  Yes


Recent Foreign Travel:  No


Contact w/Someone Who Travel:  No


Recent Infectious Disease Expo:  No


Recent Hopitalizations:  No


Physical Abuse:  No


Sexual Abuse:  No


Mistreated:  No


Fear:  No





Immunizations Up To Date


Tetanus Booster (TDap):  Unknown





Seasonal Allergies


Seasonal Allergies:  Yes





Past Medical History


Surgeries:  Yes


Orthopedic


Respiratory:  Yes


Asthma


Cardiac:  Yes


Hypertension


Neurological:  No


Seizure Disorder


Reproductive Disorders:  No


Sexually Transmitted Disease:  No


HIV/AIDS:  No


Genitourinary:  No


Gastrointestinal:  No


Musculoskeletal:  Yes


Chronic Back Pain


Endocrine:  No


HEENT:  No


Cancer:  No


Psychosocial:  No


Integumentary:  No


Blood Disorders:  No





Family Medical History


No Pertinent Family Hx





Physical Exam


Vital Signs





Vital Signs - First Documented








 6/13/20





 23:08


 


Temp 36.8


 


Pulse 113


 


Resp 18


 


B/P (MAP) 162/102 (122)


 


Pulse Ox 95


 


O2 Delivery Room Air





Capillary Refill : Less Than 3 Seconds


Height, Weight, BMI


Height: 5'7.00"


Weight: 200lbs. oz. 90.153571wa; 36.00 BMI


Method:Estimated


General Appearance:  No Apparent Distress, WD/WN


HEENT:  PERRL/EOMI, Normal ENT Inspection


Neck:  Normal Inspection


Respiratory:  Lungs Clear, Normal Breath Sounds, No Accessory Muscle Use, No 

Respiratory Distress


Cardiovascular:  Regular Rate, Rhythm, No Edema, No Murmur


Gastrointestinal:  Non Tender, Soft


Extremity:  Normal Inspection, Non Tender, No Pedal Edema


Neurologic/Psychiatric:  Alert, Oriented x3, No Motor/Sensory Deficits, Normal 

Mood/Affect, CNs II-XII Norm as Tested


Skin:  Normal Color, Warm/Dry





Progress/Results/Core Measures


Suspected Sepsis


Recent Fever Within 48 Hours:  No


Infection Criteria Present:  None


New/Unexplained  Altered Menta:  No


Sepsis Screen:  No Definite Risk


SIRS


Temperature: 


Pulse: 113 


Respiratory Rate: 18


 


Blood Pressure 162 /102 


Mean: 122





Results/Orders


Lab Results





My Orders





Vital Signs/I&O


Capillary Refill : Less Than 3 Seconds








Blood Pressure Mean:                    122








Progress Note :  


Progress Note


Patient is well connected to his primary care clinic and can follow up closely. 

He has had numerous hypertensive measurements on his prior visits in the medical

record.  For this reason we are initiating treatment for his hypertension.  I am

suspicious his headaches are related to hypertension.  Urinalysis revealed no 

blood in his urine.  He is asymptomatic at this time except for mild headache.  

I have advised close follow-up after initiating treatment of his blood pressure 

medications.  See discharge instructions for further discussion.





Departure


Impression





   Primary Impression:  


   Hypertension


   Qualified Codes:  I10 - Essential (primary) hypertension


   Additional Impression:  


   Headache


   Qualified Codes:  R51 - Headache


Disposition:  01 HOME, SELF-CARE


Condition:  Improved





Departure-Patient Inst.


Decision time for Depature:  00:29


Referrals:  


NO,LOCAL PHYSICIAN (PCP/Family)


Primary Care Physician


Patient Instructions:  High Blood Pressure (DC), Headache, Adult (DC)





Add. Discharge Instructions:  


Drink plenty of clear liquids.


Avoid things that can increase your blood pressure such as nicotine, diet pills,

energy drinks, excessive caffeine, excessive salt, decongestant medications, 

etc.


Start your prescription on Joseline 15 in the morning.


Follow-up at the clinic next week to review your labs and have your blood 

pressure checked again.


For your headache you may use ibuprofen up to 600 mg every 6 hours as needed 

and/or Tylenol (acetaminophen) up to 1000 mg every 6 hours as needed.


Return to care if you have worsening symptoms despite following these measures.





All discharge instructions reviewed with patient and/or family. Voiced 

understanding.


Scripts


Lisinopril/Hydrochlorothiazide (Lisinopril-Hctz 10-12.5 mg Tab) 1 Each Tablet


1 EACH PO DAILY, #10 TAB


   Prov: KENNETH SKINNER MD         6/14/20





Copy


Copies To 1:   LILLIAM MONTENEGRO JOSHUA T MD        Jun 14, 2020 00:30

## 2020-07-20 ENCOUNTER — HOSPITAL ENCOUNTER (OUTPATIENT)
Dept: HOSPITAL 75 - PREOP | Age: 35
Discharge: HOME | End: 2020-07-20
Attending: SURGERY
Payer: COMMERCIAL

## 2020-07-20 VITALS — WEIGHT: 231.49 LBS | BODY MASS INDEX: 36.33 KG/M2 | HEIGHT: 66.93 IN

## 2020-07-20 DIAGNOSIS — Z01.812: Primary | ICD-10-CM

## 2020-07-20 DIAGNOSIS — Z20.828: ICD-10-CM

## 2020-07-20 DIAGNOSIS — D17.1: ICD-10-CM

## 2020-07-20 PROCEDURE — 87635 SARS-COV-2 COVID-19 AMP PRB: CPT

## 2020-07-22 ENCOUNTER — HOSPITAL ENCOUNTER (OUTPATIENT)
Dept: HOSPITAL 75 - SDC | Age: 35
Discharge: LEFT BEFORE DISCHARGE | End: 2020-07-22
Attending: SURGERY
Payer: COMMERCIAL

## 2020-07-22 VITALS — SYSTOLIC BLOOD PRESSURE: 120 MMHG | DIASTOLIC BLOOD PRESSURE: 70 MMHG

## 2020-07-22 VITALS — SYSTOLIC BLOOD PRESSURE: 117 MMHG | DIASTOLIC BLOOD PRESSURE: 77 MMHG

## 2020-07-22 VITALS — HEIGHT: 66.93 IN | WEIGHT: 231.49 LBS | BODY MASS INDEX: 36.33 KG/M2

## 2020-07-22 VITALS — SYSTOLIC BLOOD PRESSURE: 122 MMHG | DIASTOLIC BLOOD PRESSURE: 78 MMHG

## 2020-07-22 VITALS — SYSTOLIC BLOOD PRESSURE: 120 MMHG | DIASTOLIC BLOOD PRESSURE: 74 MMHG

## 2020-07-22 VITALS — DIASTOLIC BLOOD PRESSURE: 117 MMHG | SYSTOLIC BLOOD PRESSURE: 151 MMHG

## 2020-07-22 VITALS — DIASTOLIC BLOOD PRESSURE: 89 MMHG | SYSTOLIC BLOOD PRESSURE: 125 MMHG

## 2020-07-22 DIAGNOSIS — I10: ICD-10-CM

## 2020-07-22 DIAGNOSIS — D17.1: Primary | ICD-10-CM

## 2020-07-22 PROCEDURE — 87081 CULTURE SCREEN ONLY: CPT

## 2020-07-22 RX ADMIN — SODIUM CHLORIDE, SODIUM LACTATE, POTASSIUM CHLORIDE, AND CALCIUM CHLORIDE PRN MLS/HR: 600; 310; 30; 20 INJECTION, SOLUTION INTRAVENOUS at 10:21

## 2020-07-22 RX ADMIN — SODIUM CHLORIDE, SODIUM LACTATE, POTASSIUM CHLORIDE, AND CALCIUM CHLORIDE PRN MLS/HR: 600; 310; 30; 20 INJECTION, SOLUTION INTRAVENOUS at 13:45

## 2020-07-22 NOTE — NUR
PATIENT ARRIVE TO FLOOR WITH FANG NEWMAN. PATIENT'S WIFE STARTED ASKING PATIENT ABOUT MULTIPLE 
WOMEN AND GAVE HIM HIS PHONE AND GOT UP AND LEFT AND TOLD HIM TO FIND A RIDE HOME. PATIENT 
REQUESTED TO LEAVE IMMEDIATELY AND TO GET IV AND ALL THINGS OFF OF HIM. THIS RN INFORMED 
PATIENT THAT IF HE LEAVES AMA INSURANCE MAY NOT PAY FOR PROCEDURE/STAY. THIS RN PHONED DR. RAMIREZ AT 1458 AND INFORMED OF ABOVE AND THAT I WAS GETTING AMA PAPER FOR PATIENT TO SIGN. 
FANG VAUGHN TOOK IV OUT PRIOR TO PATIENT LEAVING FLOOR AT 1501 WHILE I WAS ON PHONE WITH DR. RAMIREZ. PATIENT LEFT WITHOUT SIGNING AMA PAPER AND FANG GONZALES AND FANG COLMENARES NOTIFIED.

## 2020-07-22 NOTE — DISCHARGE INST-SURGICAL
Discharge Inst-Surgical


Depart Medication/Instructions


New, Converted or Re-Newed RX:  RX Given to Pt/Family


Patient Instructions


Follow up Appt:


Make appointment for 1 week. [phone]





Instructions:


No lifting greater than 20 pounds.


No strenuous activity. 


May shower in 24 hours, no tub bath or soaking.


Use incentive spirometer at home as directed.


No Smoking





Skin/Wound Care:


May remove bandages in am.  You need to leave the Dermabond on incision it will 

fall off on it's own. 





Symptoms to Report:


Appetite Changes, Extremity Discoloration, Numbness/Tingling, Swelling 

Increased, Bleeding Excessive, Eyesight Changes, Pain Increased, Urine Color 

Change, Constipation(Persistent), Fever over 101 degree F, Pain/Pressure in 

chest, Urinating Difficulty, Cough Up/Vomit Blood, Heart Beat Irreg/Pounding, 

Pain/Pressure in jaw, Cramps in feet or legs, Lightheadedness, Pain/Pressure in 

shoulder, Diarrhea(Persistent), Memory Changes Suddenly, Questions/Concerns, 

Weight gain consecutive days, Dizziness/Fainting, Nausea/Vomiting, Shortness of 

Breath, Weight gain over 2 pounds








If questions or concerns contact your physician 


Or seek help at emergency department.





Activity


Activity as Tolerated:  Yes


Activity Instructions:  Avoid Stress to Incision


Driving Instructions:  No Driving/Refer to Dr. Thomas


Discharge Diet:  No Restrictions


Diet After 24 Hours:  Clear Liquid if Nauseous


If Any Problems/Questions/Issu:  Contact Your Physician, Go to Emergency Room





Skin/Wound Care


Infection Signs and Symptoms:  Increased Redness, Foul Odor of Wound, Increased 

Drainage, Skin Itchy or Has a Rash, Increased Swelling, Temperature Above 101  F


Stitches/Staples/Dermabond Dis:  Dermabond


Ice Pack:  Ice On and Off Site (as needed for pain)











SULEIMAN RAMIREZ DO               Jul 22, 2020 13:57

## 2020-07-22 NOTE — ANESTHESIA-GENERAL POST-OP
General


Patient Condition


Mental Status/LOC:  Same as Preop


Cardiovascular:  Satisfactory


Nausea/Vomiting:  Absent


Respiratory:  Satisfactory


Pain:  Controlled


Complications:  Absent





Post Op Complications


Complications


None





Follow Up Care/Instructions


Patient Instructions


None needed.





Anesthesia/Patient Condition


Patient Condition


Patient is doing well, no complaints, stable vital signs, no apparent adverse 

anesthesia problems.   


No complications reported per nursing.











MELISSA JOSHUA CRNA              Jul 22, 2020 14:49

## 2020-07-23 NOTE — OPERATIVE REPORT
DATE OF SERVICE:  07/22/2020



PREOPERATIVE DIAGNOSIS:

Back mass.



POSTOPERATIVE DIAGNOSIS:

Back mass, pending pathology.



PROCEDURE:

Excision of mass below the fascia.  A 5.6 cm incision long x 3 cm deep x 4.8 cm

wide.



SURGEON:

Eleuterio Waller DO



FIRST ASSISTANT:

None.



ANESTHESIA:

General endotracheal tube.



SPECIMEN:

Back mass measuring 5.2 x 4.8 x 1.6 cm.



BLOOD LOSS:

Scant.



FLUIDS:

Per anesthesia.



POSTOPERATIVE CONDITION:

Stable.



INDICATION FOR PROCEDURE:

The patient is a 35-year-old male who has a mass on his back that has been

causing pain and he wanted to get this removed.



FINDINGS:

The patient had a back mass measured 5.2 x 4.8 x 1.6 cm.  It was below the

fascia almost right on top of the spine and looked like it was probably just

lipoma.



PROCEDURE NOTE:

After informed consent was obtained, the patient was brought to the operating

room.  He was intubated and placed on table in prone position.  He was then

sterilely prepped and draped in normal fashion.  Local lidocaine was used to

infiltrate the skin over this mass, then made an incision with #15 blade,

carried down through the skin into subcutaneous tissue, this incision was 5.6 cm

long.  Deep down to subcutaneous tissue with Bovie electrocautery down through

the fascia and then able to identify the mass, able to start bluntly dissecting

around it.  This was right on top of the fascia.  I could feel the spinous

process right below my finger.  It was a little bit left of the midline, then

able to carefully shell this out with blunt dissection as well as Bovie

electrocautery.  Once this was completely removed, then measured on the back

table.  It was 5.2 cm long x 4.8 cm wide x 1.6 cm high.  Measured the incision,

it was 3 cm to its  deepest point and 4.8 cm wide irrigated.  Hemostasis

obtained using Bovie electrocautery.  I then closed the fascia with 2-0 Vicryl 3

interrupted sutures and closed the skin with 4-0 undyed Monocryl 5 interrupted

subcuticular stitches.  Area was cleaned and dried.  Dermabond placed as well as

a Band-Aid.  The patient tolerated the procedure.  Sponge, instrument and needle

count correct at the end of the case.





Job ID: [id number]

DocumentID: [id number]

Dictated Date:  07/22/2020 14:22:19

Transcription Date: 07/23/2020 01:10:02

Dictated By: DO TALAT MATAMOROS

## 2021-05-10 ENCOUNTER — HOSPITAL ENCOUNTER (INPATIENT)
Dept: HOSPITAL 75 - ER | Age: 36
Discharge: LEFT BEFORE DISCHARGE | DRG: 872 | End: 2021-05-10
Attending: FAMILY MEDICINE | Admitting: FAMILY MEDICINE
Payer: COMMERCIAL

## 2021-05-10 VITALS — SYSTOLIC BLOOD PRESSURE: 157 MMHG | DIASTOLIC BLOOD PRESSURE: 100 MMHG

## 2021-05-10 VITALS — SYSTOLIC BLOOD PRESSURE: 166 MMHG | DIASTOLIC BLOOD PRESSURE: 110 MMHG

## 2021-05-10 VITALS — SYSTOLIC BLOOD PRESSURE: 163 MMHG | DIASTOLIC BLOOD PRESSURE: 99 MMHG

## 2021-05-10 VITALS — WEIGHT: 218.92 LBS | BODY MASS INDEX: 34.36 KG/M2 | HEIGHT: 66.93 IN

## 2021-05-10 VITALS — SYSTOLIC BLOOD PRESSURE: 173 MMHG | DIASTOLIC BLOOD PRESSURE: 103 MMHG

## 2021-05-10 VITALS — DIASTOLIC BLOOD PRESSURE: 108 MMHG | SYSTOLIC BLOOD PRESSURE: 158 MMHG

## 2021-05-10 DIAGNOSIS — G40.909: ICD-10-CM

## 2021-05-10 DIAGNOSIS — A41.9: Primary | ICD-10-CM

## 2021-05-10 DIAGNOSIS — K50.00: ICD-10-CM

## 2021-05-10 DIAGNOSIS — I10: ICD-10-CM

## 2021-05-10 DIAGNOSIS — F17.210: ICD-10-CM

## 2021-05-10 DIAGNOSIS — M54.9: ICD-10-CM

## 2021-05-10 DIAGNOSIS — J45.909: ICD-10-CM

## 2021-05-10 DIAGNOSIS — K52.9: ICD-10-CM

## 2021-05-10 DIAGNOSIS — G89.29: ICD-10-CM

## 2021-05-10 LAB
ALBUMIN SERPL-MCNC: 3.7 GM/DL (ref 3.2–4.5)
ALBUMIN SERPL-MCNC: 4 GM/DL (ref 3.2–4.5)
ALP SERPL-CCNC: 115 U/L (ref 40–136)
ALP SERPL-CCNC: 129 U/L (ref 40–136)
ALT SERPL-CCNC: 21 U/L (ref 0–55)
ALT SERPL-CCNC: 22 U/L (ref 0–55)
APTT BLD: 28 SEC (ref 24–35)
BASOPHILS # BLD AUTO: 0.1 10^3/UL (ref 0–0.1)
BASOPHILS # BLD AUTO: 0.1 10^3/UL (ref 0–0.1)
BASOPHILS NFR BLD AUTO: 0 % (ref 0–10)
BASOPHILS NFR BLD AUTO: 0 % (ref 0–10)
BILIRUB SERPL-MCNC: 0.3 MG/DL (ref 0.1–1)
BILIRUB SERPL-MCNC: 0.4 MG/DL (ref 0.1–1)
BUN/CREAT SERPL: 9
BUN/CREAT SERPL: 9
CALCIUM SERPL-MCNC: 8.4 MG/DL (ref 8.5–10.1)
CALCIUM SERPL-MCNC: 9 MG/DL (ref 8.5–10.1)
CHLORIDE SERPL-SCNC: 105 MMOL/L (ref 98–107)
CHLORIDE SERPL-SCNC: 105 MMOL/L (ref 98–107)
CO2 SERPL-SCNC: 19 MMOL/L (ref 21–32)
CO2 SERPL-SCNC: 22 MMOL/L (ref 21–32)
CREAT SERPL-MCNC: 1.27 MG/DL (ref 0.6–1.3)
CREAT SERPL-MCNC: 1.38 MG/DL (ref 0.6–1.3)
EOSINOPHIL # BLD AUTO: 0.4 10^3/UL (ref 0–0.3)
EOSINOPHIL # BLD AUTO: 0.6 10^3/UL (ref 0–0.3)
EOSINOPHIL NFR BLD AUTO: 2 % (ref 0–10)
EOSINOPHIL NFR BLD AUTO: 3 % (ref 0–10)
EOSINOPHIL NFR BLD MANUAL: 1 %
GFR SERPLBLD BASED ON 1.73 SQ M-ARVRAT: > 60 ML/MIN
GFR SERPLBLD BASED ON 1.73 SQ M-ARVRAT: > 60 ML/MIN
GLUCOSE SERPL-MCNC: 100 MG/DL (ref 70–105)
GLUCOSE SERPL-MCNC: 103 MG/DL (ref 70–105)
HCT VFR BLD CALC: 39 % (ref 40–54)
HCT VFR BLD CALC: 41 % (ref 40–54)
HGB BLD-MCNC: 12.7 G/DL (ref 13.3–17.7)
HGB BLD-MCNC: 13.4 G/DL (ref 13.3–17.7)
INR PPP: 1 (ref 0.8–1.4)
LIPASE SERPL-CCNC: 11 U/L (ref 8–78)
LYMPHOCYTES # BLD AUTO: 2.6 10^3/UL (ref 1–4)
LYMPHOCYTES # BLD AUTO: 3.1 10^3/UL (ref 1–4)
LYMPHOCYTES NFR BLD AUTO: 11 % (ref 12–44)
LYMPHOCYTES NFR BLD AUTO: 15 % (ref 12–44)
MANUAL DIFFERENTIAL PERFORMED BLD QL: NO
MANUAL DIFFERENTIAL PERFORMED BLD QL: YES
MCH RBC QN AUTO: 29 PG (ref 25–34)
MCH RBC QN AUTO: 29 PG (ref 25–34)
MCHC RBC AUTO-ENTMCNC: 33 G/DL (ref 32–36)
MCHC RBC AUTO-ENTMCNC: 33 G/DL (ref 32–36)
MCV RBC AUTO: 88 FL (ref 80–99)
MCV RBC AUTO: 88 FL (ref 80–99)
MONOCYTES # BLD AUTO: 1.2 10^3/UL (ref 0–1)
MONOCYTES # BLD AUTO: 1.5 10^3/UL (ref 0–1)
MONOCYTES NFR BLD AUTO: 6 % (ref 0–12)
MONOCYTES NFR BLD AUTO: 6 % (ref 0–12)
MONOCYTES NFR BLD: 9 %
NEUTROPHILS # BLD AUTO: 15.8 10^3/UL (ref 1.8–7.8)
NEUTROPHILS # BLD AUTO: 19.3 10^3/UL (ref 1.8–7.8)
NEUTROPHILS NFR BLD AUTO: 75 % (ref 42–75)
NEUTROPHILS NFR BLD AUTO: 80 % (ref 42–75)
NEUTS BAND NFR BLD MANUAL: 78 %
PLATELET # BLD: 377 10^3/UL (ref 130–400)
PLATELET # BLD: 386 10^3/UL (ref 130–400)
PMV BLD AUTO: 10 FL (ref 9–12.2)
PMV BLD AUTO: 11.1 FL (ref 9–12.2)
POTASSIUM SERPL-SCNC: 3.5 MMOL/L (ref 3.6–5)
POTASSIUM SERPL-SCNC: 6.3 MMOL/L (ref 3.6–5)
PROT SERPL-MCNC: 7.4 GM/DL (ref 6.4–8.2)
PROT SERPL-MCNC: 8.1 GM/DL (ref 6.4–8.2)
PROTHROMBIN TIME: 13.3 SEC (ref 12.2–14.7)
RBC MORPH BLD: NORMAL
SODIUM SERPL-SCNC: 135 MMOL/L (ref 135–145)
SODIUM SERPL-SCNC: 140 MMOL/L (ref 135–145)
VARIANT LYMPHS NFR BLD MANUAL: 12 %
WBC # BLD AUTO: 21 10^3/UL (ref 4.3–11)
WBC # BLD AUTO: 24.1 10^3/UL (ref 4.3–11)

## 2021-05-10 PROCEDURE — 85610 PROTHROMBIN TIME: CPT

## 2021-05-10 PROCEDURE — 85007 BL SMEAR W/DIFF WBC COUNT: CPT

## 2021-05-10 PROCEDURE — 36415 COLL VENOUS BLD VENIPUNCTURE: CPT

## 2021-05-10 PROCEDURE — 71045 X-RAY EXAM CHEST 1 VIEW: CPT

## 2021-05-10 PROCEDURE — 74177 CT ABD & PELVIS W/CONTRAST: CPT

## 2021-05-10 PROCEDURE — 85730 THROMBOPLASTIN TIME PARTIAL: CPT

## 2021-05-10 PROCEDURE — 80053 COMPREHEN METABOLIC PANEL: CPT

## 2021-05-10 PROCEDURE — 87040 BLOOD CULTURE FOR BACTERIA: CPT

## 2021-05-10 PROCEDURE — 85652 RBC SED RATE AUTOMATED: CPT

## 2021-05-10 PROCEDURE — 85025 COMPLETE CBC W/AUTO DIFF WBC: CPT

## 2021-05-10 PROCEDURE — 83690 ASSAY OF LIPASE: CPT

## 2021-05-10 PROCEDURE — 86141 C-REACTIVE PROTEIN HS: CPT

## 2021-05-10 PROCEDURE — 84132 ASSAY OF SERUM POTASSIUM: CPT

## 2021-05-10 PROCEDURE — 85027 COMPLETE CBC AUTOMATED: CPT

## 2021-05-10 PROCEDURE — 83605 ASSAY OF LACTIC ACID: CPT

## 2021-05-10 RX ADMIN — SODIUM CHLORIDE, SODIUM LACTATE, POTASSIUM CHLORIDE, AND CALCIUM CHLORIDE SCH MLS/HR: 600; 310; 30; 20 INJECTION, SOLUTION INTRAVENOUS at 16:53

## 2021-05-10 RX ADMIN — SODIUM CHLORIDE, SODIUM LACTATE, POTASSIUM CHLORIDE, AND CALCIUM CHLORIDE SCH MLS/HR: 600; 310; 30; 20 INJECTION, SOLUTION INTRAVENOUS at 04:52

## 2021-05-10 RX ADMIN — SODIUM CHLORIDE, SODIUM LACTATE, POTASSIUM CHLORIDE, AND CALCIUM CHLORIDE SCH MLS/HR: 600; 310; 30; 20 INJECTION, SOLUTION INTRAVENOUS at 10:46

## 2021-05-10 RX ADMIN — SODIUM CHLORIDE, SODIUM LACTATE, POTASSIUM CHLORIDE, AND CALCIUM CHLORIDE SCH MLS/HR: 600; 310; 30; 20 INJECTION, SOLUTION INTRAVENOUS at 18:30

## 2021-05-10 NOTE — DIAGNOSTIC IMAGING REPORT
PROCEDURE: CT abdomen and pelvis with contrast.



TECHNIQUE: Multiple contiguous axial images were obtained through

the abdomen and pelvis after administration of intravenous

contrast. Auto Exposure Controls were utilized during the CT exam

to meet ALARA standards for radiation dose reduction. All CT

scans use one or more of the following dose optimizing

techniques: automated exposure control, MA and/or KvP adjustment

based on patient size and exam type or iterative reconstruction.



INDICATION:  Progressively worsening abdominal pain.



No focal hepatic or splenic abnormalities identified. No

gallbladder, pancreatic or adrenal gland lesion is identified and

the kidneys are unremarkable in appearance. There is no free

fluid within the abdomen or pelvis. There is marked mural

thickening of the terminal ileum with mild surrounding edema

and/or inflammation. No organized fluid collection is identified.

There is no significant bowel dilatation to indicate an

obstruction. No organized fluid collection is seen to indicate an

abscess.



IMPRESSION: Marked inflammation of terminal ileum with

normal-appearing appendix. No obstruction is identified. Findings

indicate terminal ileitis and correlation with possible

inflammatory bowel disease such as Crohn's disease would be

useful.



There is no evidence of drainable abscess.



Dictated by: 



  Dictated on workstation # [id number]

## 2021-05-10 NOTE — HISTORY & PHYSICAL-HOSPITALIST
History of Present Illness


HPI/Chief Complaint


Jl Samson is a 36 year old male who presented with abdominal pain. He 

reports having constipation over the past few days. He reports nausea and 

vomiting once. He denies diarrhea. He denies fevers and chills. He denies blood 

in his stools. He denies chest pain. He denies shortness of breath. He denies 

ever having pain like this before. He has no family history of inflammatory 

bowel disease. No one else has been sick with similar symptoms.


Source:  patient


Exam Limitations:  no limitations


Date Seen


5/10/21


Time Seen by a Provider:  10:05


Attending Physician


Natasha Dawn MD


PCP


No,Local Physician


Referring Physician





Date of Admission


May 10, 2021 at 02:56





Home Medications & Allergies


Home Medications


Reviewed patient Home Medication Reconciliation performed by pharmacy medication

reconciliations technician and/or nursing.


Patients Allergies have been reviewed.





Allergies





Allergies


Coded Allergies


  No Known Drug Allergies (Unverified7/15/20)








Patient Social History


Tobacco Use?:  Yes


Tobacco type used:  Cigarettes


Smoking Status:  Current Everyday Smoker


Use of E-Cig and/or Vaping dev:  Yes


E-Cig and/or Vaping Freq:  Current Someday User


Substance type:  Marijuana


Alcohol Use?:  Yes


Alcohol type:  Beer


Alcohol Frequency:  Once in a while


Pt stated abuse/neglect:  No





Immunizations Up To Date


Influenza Vaccine Up-to-Date:  Yes; Up-to-Date


Tetanus Booster (TDap):  Unknown


Hepatitis A:  Yes


Hepatitis B:  Yes





Current Status


Do you have an Advance Directi:  No


Communicates:  Verbally


Primary Language:  English


Preferred Spoken Language:  English


Is interpretation needed?:  No





Past Medical History





HTN





Family Medical History


Family Hx:  


Noncontributory





Review of Systems


Constitutional:  no symptoms reported


EENTM:  no symptoms reported


Respiratory:  no symptoms reported


Cardiovascular:  no symptoms reported


Gastrointestinal:  abdominal pain, constipation, nausea, vomiting


Genitourinary:  no symptoms reported


Musculoskeletal:  no symptoms reported


Skin:  no symptoms reported


Psychiatric/Neurological:  No Symptoms Reported





Physical Exam


Physical Exam


Vital Signs





Vital Signs - First Documented








 5/10/21





 00:49


 


Pulse 100


 


Resp 16


 


Pulse Ox 95


 


O2 Delivery Room Air





Capillary Refill : Less Than 3 Seconds


Height, Weight, BMI


Height: 5'7.00"


Weight: 200lbs. oz. 90.232156wj; 34.35 BMI


Method:Estimated


General Appearance:  No Apparent Distress, Moderate Distress (writhing in pain)


Neck:  Normal Inspection, Supple


Respiratory:  Lungs Clear, Normal Breath Sounds, No Respiratory Distress


Cardiovascular:  Regular Rate, Rhythm, No Edema, No Murmur


Gastrointestinal:  Normal Bowel Sounds, Non Tender, Soft


Extremity:  Normal Inspection, Non Tender, No Pedal Edema


Neurologic/Psychiatric:  Alert, No Motor/Sensory Deficits


Skin:  Normal Color, Warm/Dry


Lymphatic:  No Adenopathy





Results


Results/Procedures


Labs


Laboratory Tests


5/10/21 00:59








5/10/21 04:00








5/10/21 05:59








Patient resulted labs reviewed.


Imaging:  Reviewed Imaging Report





Assessment/Plan


Admission Diagnosis


Sepsis due to gastroenteritis


Admission Status:  Inpatient Order (span 2 midnights)


Reason for Inpatient Admission:  


Ileitis requiring likely endoscopic evaluation





Assessment and Plan


Sepsis


Gastroenteritis


Terminal ileitis


   SIRS+ with leukocytosis and tachycardia


   CT abdomen showed terminal ileitis


   Started on Rocephin and Flagyl


   Pain regimen


   Antiemetics


   Consult surgery, appreciate assistance


   IV fluids





HTN


   Begin Lisinopril and Hydralazine


   IV Hydralazine as needed





DVT prophylaxis: ambulation





Diagnosis/Problems


Diagnosis/Problems





(1) Sepsis


Status:  Acute


Qualifiers:  


   Sepsis type:  sepsis due to unspecified organism  Sepsis acute organ 

dysfunction status:  without acute organ dysfunction  Qualified Codes:  A41.9 - 

Sepsis, unspecified organism


(2) Terminal ileitis without complication


Status:  Acute


(3) HTN (hypertension)


Status:  Acute


Qualifiers:  


   Hypertension type:  essential hypertension  Qualified Codes:  I10 - Essential

(primary) hypertension











RADHA BAILEY MD              May 10, 2021 15:23

## 2021-05-10 NOTE — DIAGNOSTIC IMAGING REPORT
INDICATION: Sepsis and abdominal pain.



Single AP view of the chest is obtained with comparison made to

study of 04/14/2020.



There is mild cardiomegaly. Pulmonary vascularity is

unremarkable. No pneumothorax or consolidation is identified.



IMPRESSION: Mild cardiomegaly without new abnormality or adverse

change.



Dictated by: 



  Dictated on workstation # [id number]

## 2021-05-10 NOTE — ED ABDOMINAL PAIN
General


Chief Complaint:  Abdominal/GI Problems


Stated Complaint:  STOMACH PAIN; N/V


Nursing Triage Note:  


Patient states abdominal pain for several days that has become progressively 


worse. Patient states lower abdominal pain. He has had difficulty with bowel 


movements and has vomitted once tonight. Patient states he had a sprite about 


0700.


Sepsis Screen:  No Definite Risk


Source of Information:  Patient, Other


Exam Limitations:  No Limitations





History of Present Illness


Date Seen by Provider:  May 10, 2021


Time Seen by Provider:  00:53


Initial Comments


Patient presents ER by private conveyance from home with chief complaint he has 

had constipation since Friday and has had difficulty passing a bowel movement 

tonight despite trying several times.  He vomited once and still has some 

nausea.  No fevers or chills.  No history of abdominal surgery or trauma.  

History of high blood pressure but does not follow with a doctor or take any 

medicines anymore because he did not feel like they are helping.  No dysuria 

discharge or dyspareunia.  Because of the nausea has not taken anything for pain

today.





Allergies and Home Medications


Allergies


Coded Allergies:  


     No Known Drug Allergies (Unverified , 7/15/20)





Home Medications


Hydrocodone Bit/Acetaminophen 1 Ea Tab, 1 TAB PO Q6H


   Prescribed by: SULEIMAN RAMIREZ on 7/22/20 1356


Lisinopril/Hydrochlorothiazide 1 Each Tablet, 1 EACH PO DAILY


   Prescribed by: KENNETH BOYCE on 6/14/20 0030





Patient Home Medication List


Home Medication List Reviewed:  Yes





Review of Systems


Review of Systems


Constitutional:  No chills, No diaphoresis


EENTM:  No Blurred Vision, No Double Vision


Respiratory:  Denies Cough, Denies Shortness of Air


Cardiovascular:  Denies Chest Pain, Denies Lightheadedness


Gastrointestinal:  See HPI; Denies Abdomen Distended; Abdominal Pain, 

Constipated; Denies Diarrhea; Nausea, Vomiting


Genitourinary:  Denies Burning, Denies Discharge


Musculoskeletal:  No back pain, No joint pain





All Other Systems Reviewed


Negative Unless Noted:  Yes





Past Medical-Social-Family Hx


Patient Social History


Alcohol Use:  Denies Use


Number of Drinks Today:  AA


Alcohol Beverage of Choice:  Beer


Drug of Choice:  cocaine


Smoking Status:  Current Everyday Smoker


Type Used:  Cigarettes


2nd Hand Smoke Exposure:  Yes


Recent Infectious Disease Expo:  No


Recent Hopitalizations:  No





Immunizations Up To Date


Tetanus Booster (TDap):  Unknown


Date of Influenza Vaccine:  Oct 7, 2019





Seasonal Allergies


Seasonal Allergies:  Yes





Past Medical History


Surgeries:  Yes (LIPMA REMOVAL KNEE)


Orthopedic


Respiratory:  Yes


Asthma


Currently Using CPAP:  No


Currently Using BIPAP:  No


Cardiac:  Yes


Hypertension


Neurological:  No (SEIZURES AS CHILD-NONE SINCE AGE 16)


Seizure Disorder


Reproductive Disorders:  No


Sexually Transmitted Disease:  No


HIV/AIDS:  No


Genitourinary:  No


Gastrointestinal:  No


Musculoskeletal:  Yes


Chronic Back Pain


Endocrine:  No


HEENT:  Yes (GLASSES)


Loss of Vision:  Denies


Hearing Impairment:  Denies


Cancer:  No


Psychosocial:  No


Integumentary:  Yes


Eczema


Blood Disorders:  No


Adverse Reaction/Blood Tranf:  No (N/A)





Family Medical History


No Pertinent Family Hx





Physical Exam


Vital Signs





Vital Signs - First Documented








 5/10/21





 00:49


 


Pulse 100


 


Resp 16


 


Pulse Ox 95


 


O2 Delivery Room Air





Capillary Refill : Less Than 3 Seconds


Height/Weight/BMI


Height: 5'7.00"


Weight: 200lbs. oz. 90.736264of; 34.00 BMI


Method:Estimated


General Appearance:  WD/WN, moderate distress


HEENT:  PERRL/EOMI, pharynx normal


Neck:  full range of motion, normal inspection


Respiratory:  lungs clear, normal breath sounds, no respiratory distress, no 

accessory muscle use


Cardiovascular:  normal peripheral pulses, regular rate, rhythm


Peripheral Pulses:  2+ Radial Pulses (R), 2+ Radial Pulses (L)


Gastrointestinal:  normal bowel sounds (Active bowel sounds all 4 quadrants), 

guarding (Patient is laying in the fetal position refuses to lay on his back and

guarding his abdomen.), tenderness (All 4 quadrants with heeltap tenderness)


Extremities:  normal range of motion, normal inspection, normal capillary refill


Neurologic/Psychiatric:  alert, normal mood/affect, oriented x 3


Skin:  normal color, warm/dry





Focused Exam


Lactate Level


5/10/21 02:14: Lactic Acid Level 0.86





Lactic Acid Level





Laboratory Tests








Test


 5/10/21


02:14


 


Lactic Acid Level


 0.86 MMOL/L


(0.50-2.00)











Progress/Results/Core Measures


Results/Orders


Lab Results





Laboratory Tests








Test


 5/10/21


00:59 5/10/21


02:14 Range/Units


 


 


White Blood Count


 21.0 H


 


 4.3-11.0


10^3/uL


 


Red Blood Count


 4.64 


 


 4.30-5.52


10^6/uL


 


Hemoglobin 13.4   13.3-17.7  g/dL


 


Hematocrit 41   40-54  %


 


Mean Corpuscular Volume 88   80-99  fL


 


Mean Corpuscular Hemoglobin 29   25-34  pg


 


Mean Corpuscular Hemoglobin


Concent 33 


 


 32-36  g/dL





 


Red Cell Distribution Width 12.0   10.0-14.5  %


 


Platelet Count


 386 


 


 130-400


10^3/uL


 


Mean Platelet Volume 10.0   9.0-12.2  fL


 


Immature Granulocyte % (Auto) 1    %


 


Neutrophils (%) (Auto) 75   42-75  %


 


Lymphocytes (%) (Auto) 15   12-44  %


 


Monocytes (%) (Auto) 6   0-12  %


 


Eosinophils (%) (Auto) 3   0-10  %


 


Basophils (%) (Auto) 0   0-10  %


 


Neutrophils # (Auto)


 15.8 H


 


 1.8-7.8


10^3/uL


 


Lymphocytes # (Auto)


 3.1 


 


 1.0-4.0


10^3/uL


 


Monocytes # (Auto)


 1.2 H


 


 0.0-1.0


10^3/uL


 


Eosinophils # (Auto)


 0.6 H


 


 0.0-0.3


10^3/uL


 


Basophils # (Auto)


 0.1 


 


 0.0-0.1


10^3/uL


 


Immature Granulocyte # (Auto)


 0.2 H


 


 0.0-0.1


10^3/uL


 


Neutrophils % (Manual) 78    %


 


Lymphocytes % (Manual) 12    %


 


Monocytes % (Manual) 9    %


 


Eosinophils % (Manual) 1    %


 


Blood Morphology Comment NORMAL    


 


Sodium Level 140   135-145  MMOL/L


 


Potassium Level 3.5 L  3.6-5.0  MMOL/L


 


Chloride Level 105     MMOL/L


 


Carbon Dioxide Level 22   21-32  MMOL/L


 


Anion Gap 13   5-14  MMOL/L


 


Blood Urea Nitrogen 13   7-18  MG/DL


 


Creatinine


 1.38 H


 


 0.60-1.30


MG/DL


 


Estimat Glomerular Filtration


Rate > 60 


 


  





 


BUN/Creatinine Ratio 9    


 


Glucose Level 103     MG/DL


 


Calcium Level 9.0   8.5-10.1  MG/DL


 


Corrected Calcium 9.0   8.5-10.1  MG/DL


 


Total Bilirubin 0.4   0.1-1.0  MG/DL


 


Aspartate Amino Transf


(AST/SGOT) 21 


 


 5-34  U/L





 


Alanine Aminotransferase


(ALT/SGPT) 21 


 


 0-55  U/L





 


Alkaline Phosphatase 129     U/L


 


C-Reactive Protein High


Sensitivity 2.11 H


 


 0.00-0.50


MG/DL


 


Total Protein 7.4   6.4-8.2  GM/DL


 


Albumin 4.0   3.2-4.5  GM/DL


 


Lipase 11   8-78  U/L


 


Lactic Acid Level


 


 0.86 


 0.50-2.00


MMOL/L








My Orders





Orders - REY LITTLE


Ed Iv/Invasive Line Start (5/10/21 01:06)


Lactated Ringers (Lr 1000 Ml Iv Solution (5/10/21 01:15)


Ct Abdomen/Pelvis W (5/10/21 01:06)


Fentanyl  Inj (Sublimaze Injection) (5/10/21 01:15)


Ondansetron Injection (Zofran Injectio (5/10/21 01:15)


Cbc With Automated Diff (5/10/21 01:06)


Comprehensive Metabolic Panel (5/10/21 01:06)


Hs C Reactive Protein (5/10/21 01:06)


Lipase (5/10/21 01:06)


Manual Differential (5/10/21 00:59)


Iohexol Injection (Omnipaque 350 Mg/Ml 1 (5/10/21 01:30)


Received Contrast (Hold Metformin- Contr (5/10/21 01:30)


Ns (Ivpb) (Sodium Chloride 0.9% Ivpb Bag (5/10/21 01:30)


Blood Culture (5/10/21 02:01)


Sputum Culture (5/10/21 02:01)


Urinalysis (5/10/21 02:01)


Urine Culture (5/10/21 02:01)


Protime With Inr (5/10/21 02:01)


Partial Thromboplastin Time (5/10/21 02:01)


Chest 1 View, Ap/Pa Only (5/10/21 02:01)


Ed Iv/Invasive Line Start (5/10/21 02:01)


Ed Iv/Invasive Line Start (5/10/21 02:01)


Vital Signs Adult Sepsis Patie Q15M (5/10/21 02:01)


O2 (5/10/21 02:01)


Remove Rings In Anticipation O (5/10/21 02:01)


Lactic Acid Analyzer (5/10/21 02:01)


Lactated Ringers (Lr 1000 Ml Iv Solution (5/10/21 02:15)


Ceftriaxone  For Iv Use (Rocephin  For I (5/10/21 02:15)


Metronidazole 500mg/100ml Ivpb (Flagyl 5 (5/10/21 02:15)


Morphine  Injection (Morphine  Injection (5/10/21 02:34)





Medications Given in ED





Current Medications








 Medications  Dose


 Ordered  Sig/Prem


 Route  Start Time


 Stop Time Status Last Admin


Dose Admin


 


 Ceftriaxone


 Sodium 1000 mg/


 Sterile Water  10 ml @ 


 200 mls/hr  ONCE  ONCE


 IV  5/10/21 02:15


 5/10/21 02:17 DC 5/10/21 02:29


200 MLS/HR


 


 Fentanyl Citrate  50 mcg  ONCE  ONCE


 IVP  5/10/21 01:15


 5/10/21 01:16 DC 5/10/21 01:16


50 MCG


 


 Iohexol  100 ml  ONCE  ONCE


 IV  5/10/21 01:30


 5/10/21 01:31 DC 5/10/21 01:48


100 ML


 


 Lactated Ringer's  1,000 ml @ 


 0 mls/hr  Q0M ONCE


 IV  5/10/21 01:15


 5/10/21 01:16 DC 5/10/21 01:16


0 MLS/HR


 


 Lactated Ringer's  1,000 ml @ 


 0 mls/hr  Q0M ONCE


 IV  5/10/21 02:15


 5/10/21 02:16 DC 5/10/21 02:28


0 MLS/HR


 


 Metronidazole  100 ml @ 


 100 mls/hr  ONCE  ONCE


 IV  5/10/21 02:15


 5/10/21 03:14 DC 5/10/21 02:28


100 MLS/HR


 


 Ondansetron HCl  8 mg  ONCE  ONCE


 IVP  5/10/21 01:15


 5/10/21 01:16 DC 5/10/21 01:16


8 MG


 


 Sodium Chloride  100 ml  ONCE  ONCE


 IV  5/10/21 01:30


 5/10/21 01:31 DC 5/10/21 01:48


80 ML








Vital Signs/I&O











 5/10/21





 00:49


 


Pulse 100


 


Resp 16


 


B/P (MAP) 


 


Pulse Ox 95


 


O2 Delivery Room Air











Progress


Progress Note #1:  


   Time:  01:11


Progress Note


Fentanyl, Zofran, fluids, CT abdomen pelvis labs.  Obstipation versus bowel 

obstruction versus appendicitis versus diverticulitis Meckel's etc.


Progress Note #2:  


   Time:  02:24


Progress Note


Patient's pain is now 2 out of 10, nausea is gone.  Explained to him that 

because of the sepsis and terminal ileitis which could be an infection we are 

going to recommend him to stay in the hospital for a couple days on IV 

antibiotics while cultures are running.  Patient was not enthusiastic about 

staying in the hospital but is agreeing with the plan for now.  We discussed him

the possibility of inflammatory bowel disease as well as much less likely there 

is a small chance it could be malignancy and we would need to do more work-up 

after he is in the hospital.





Diagnostic Imaging





   Diagonstic Imaging:  CT


   Plain Films/CT/US/NM/MRI:  abdomen, pelvis


Comments


Inflammatory distal small bowel involving the terminal ileum.  Normal-looking 

appendix.  Findings suspicious for terminal ileitis.  Correlate with any 

indication or history of Crohn's disease.  Small gas bubble that cannot be 

definitively identified as intraluminal.  No drainable abscess visualized.


   Reviewed:  Reviewed Night Justyn Study, Reviewed by Me, Discussed w/Radiologist








   Diagonstic Imaging:  Xray


   Plain Films/CT/US/NM/MRI:  chest


Comments


No acute cardiopulmonary processes on 1 view chest x-ray


   Reviewed:  Reviewed by Me





Departure


Communication (Admissions)


Time/Spoke to Admitting Phy:  02:45


Dr. Nicolas agrees to observe the patient with IV antibiotics.





Impression





   Primary Impression:  


   Terminal ileitis without complication


   Additional Impression:  


   Sepsis


   Qualified Codes:  A41.9 - Sepsis, unspecified organism


Disposition:  09 ADMITTED AS INPATIENT


Condition:  Stable





Admissions


Decision to Admit Reason:  Admit from ER (General)


Decision to Admit/Date:  May 10, 2021


Time/Decision to Admit Time:  02:18





Departure-Patient Inst.


Referrals:  


NO,LOCAL PHYSICIAN (PCP/Family)


Primary Care Physician











REY LITTLE                 May 10, 2021 01:11

## 2021-07-30 ENCOUNTER — HOSPITAL ENCOUNTER (EMERGENCY)
Dept: HOSPITAL 75 - ER | Age: 36
Discharge: HOME | End: 2021-07-30
Payer: COMMERCIAL

## 2021-07-30 VITALS — BODY MASS INDEX: 34.95 KG/M2 | HEIGHT: 66.97 IN | WEIGHT: 222.67 LBS

## 2021-07-30 VITALS — DIASTOLIC BLOOD PRESSURE: 108 MMHG | SYSTOLIC BLOOD PRESSURE: 145 MMHG

## 2021-07-30 DIAGNOSIS — J45.909: ICD-10-CM

## 2021-07-30 DIAGNOSIS — U07.1: Primary | ICD-10-CM

## 2021-07-30 DIAGNOSIS — I10: ICD-10-CM

## 2021-07-30 DIAGNOSIS — F17.210: ICD-10-CM

## 2021-07-30 PROCEDURE — 87636 SARSCOV2 & INF A&B AMP PRB: CPT

## 2021-07-30 PROCEDURE — 99282 EMERGENCY DEPT VISIT SF MDM: CPT

## 2021-10-12 ENCOUNTER — HOSPITAL ENCOUNTER (EMERGENCY)
Dept: HOSPITAL 75 - ER | Age: 36
Discharge: HOME | End: 2021-10-12
Payer: SELF-PAY

## 2021-10-12 VITALS — HEIGHT: 65.75 IN | WEIGHT: 220.46 LBS | BODY MASS INDEX: 35.86 KG/M2

## 2021-10-12 VITALS — DIASTOLIC BLOOD PRESSURE: 110 MMHG | SYSTOLIC BLOOD PRESSURE: 135 MMHG

## 2021-10-12 DIAGNOSIS — M54.2: Primary | ICD-10-CM

## 2021-10-12 DIAGNOSIS — M79.602: ICD-10-CM

## 2021-10-12 DIAGNOSIS — I10: ICD-10-CM

## 2021-10-12 DIAGNOSIS — R10.84: ICD-10-CM

## 2021-10-12 DIAGNOSIS — J45.909: ICD-10-CM

## 2021-10-12 DIAGNOSIS — R51.9: ICD-10-CM

## 2021-10-12 DIAGNOSIS — R07.89: ICD-10-CM
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
## 2021-10-12 LAB
ALBUMIN SERPL-MCNC: 3.8 GM/DL (ref 3.2–4.5)
ALP SERPL-CCNC: 90 U/L (ref 40–136)
ALT SERPL-CCNC: 31 U/L (ref 0–55)
BASOPHILS # BLD AUTO: 0 10^3/UL (ref 0–0.1)
BASOPHILS NFR BLD AUTO: 0 % (ref 0–10)
BILIRUB SERPL-MCNC: 0.5 MG/DL (ref 0.1–1)
BUN/CREAT SERPL: 11
CALCIUM SERPL-MCNC: 9.6 MG/DL (ref 8.5–10.1)
CHLORIDE SERPL-SCNC: 108 MMOL/L (ref 98–107)
CO2 SERPL-SCNC: 20 MMOL/L (ref 21–32)
CREAT SERPL-MCNC: 1.14 MG/DL (ref 0.6–1.3)
EOSINOPHIL # BLD AUTO: 0.7 10^3/UL (ref 0–0.3)
EOSINOPHIL NFR BLD AUTO: 6 % (ref 0–10)
GFR SERPLBLD BASED ON 1.73 SQ M-ARVRAT: 88 ML/MIN
GLUCOSE SERPL-MCNC: 101 MG/DL (ref 70–105)
HCT VFR BLD CALC: 42 % (ref 40–54)
HGB BLD-MCNC: 14.1 G/DL (ref 13.3–17.7)
LYMPHOCYTES # BLD AUTO: 3.4 10^3/UL (ref 1–4)
LYMPHOCYTES NFR BLD AUTO: 30 % (ref 12–44)
MANUAL DIFFERENTIAL PERFORMED BLD QL: NO
MCH RBC QN AUTO: 30 PG (ref 25–34)
MCHC RBC AUTO-ENTMCNC: 33 G/DL (ref 32–36)
MCV RBC AUTO: 90 FL (ref 80–99)
MONOCYTES # BLD AUTO: 1.1 10^3/UL (ref 0–1)
MONOCYTES NFR BLD AUTO: 10 % (ref 0–12)
NEUTROPHILS # BLD AUTO: 6.1 10^3/UL (ref 1.8–7.8)
NEUTROPHILS NFR BLD AUTO: 54 % (ref 42–75)
PLATELET # BLD: 351 10^3/UL (ref 130–400)
PMV BLD AUTO: 10.5 FL (ref 9–12.2)
POTASSIUM SERPL-SCNC: 4 MMOL/L (ref 3.6–5)
PROT SERPL-MCNC: 7 GM/DL (ref 6.4–8.2)
SODIUM SERPL-SCNC: 138 MMOL/L (ref 135–145)
WBC # BLD AUTO: 11.3 10^3/UL (ref 4.3–11)

## 2021-10-12 PROCEDURE — 86850 RBC ANTIBODY SCREEN: CPT

## 2021-10-12 PROCEDURE — 70450 CT HEAD/BRAIN W/O DYE: CPT

## 2021-10-12 PROCEDURE — 86901 BLOOD TYPING SEROLOGIC RH(D): CPT

## 2021-10-12 PROCEDURE — 74177 CT ABD & PELVIS W/CONTRAST: CPT

## 2021-10-12 PROCEDURE — 80053 COMPREHEN METABOLIC PANEL: CPT

## 2021-10-12 PROCEDURE — 85025 COMPLETE CBC W/AUTO DIFF WBC: CPT

## 2021-10-12 PROCEDURE — 72125 CT NECK SPINE W/O DYE: CPT

## 2021-10-12 PROCEDURE — 86900 BLOOD TYPING SEROLOGIC ABO: CPT

## 2021-10-12 PROCEDURE — 71045 X-RAY EXAM CHEST 1 VIEW: CPT

## 2021-10-12 PROCEDURE — 36415 COLL VENOUS BLD VENIPUNCTURE: CPT
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
## 2021-12-25 ENCOUNTER — HOSPITAL ENCOUNTER (EMERGENCY)
Dept: HOSPITAL 75 - ER | Age: 36
Discharge: HOME | End: 2021-12-25
Payer: SELF-PAY

## 2021-12-25 VITALS — BODY MASS INDEX: 34.6 KG/M2 | HEIGHT: 67.01 IN | WEIGHT: 220.46 LBS

## 2021-12-25 VITALS — DIASTOLIC BLOOD PRESSURE: 100 MMHG | SYSTOLIC BLOOD PRESSURE: 145 MMHG

## 2021-12-25 DIAGNOSIS — N45.1: Primary | ICD-10-CM

## 2021-12-25 DIAGNOSIS — J45.909: ICD-10-CM

## 2021-12-25 DIAGNOSIS — I10: ICD-10-CM

## 2021-12-25 LAB
APTT PPP: YELLOW S
BACTERIA #/AREA URNS HPF: (no result) /HPF
BILIRUB UR QL STRIP: NEGATIVE
FIBRINOGEN PPP-MCNC: (no result) MG/DL
GLUCOSE UR STRIP-MCNC: NEGATIVE MG/DL
KETONES UR QL STRIP: NEGATIVE
LEUKOCYTE ESTERASE UR QL STRIP: (no result)
NITRITE UR QL STRIP: NEGATIVE
PH UR STRIP: 6 [PH] (ref 5–9)
PROT UR QL STRIP: (no result)
RBC #/AREA URNS HPF: (no result) /HPF
SP GR UR STRIP: 1.02 (ref 1.02–1.02)
SQUAMOUS #/AREA URNS HPF: (no result) /HPF
WBC #/AREA URNS HPF: (no result) /HPF

## 2021-12-25 PROCEDURE — 87088 URINE BACTERIA CULTURE: CPT

## 2021-12-25 PROCEDURE — 81000 URINALYSIS NONAUTO W/SCOPE: CPT

## 2021-12-25 PROCEDURE — 87591 N.GONORRHOEAE DNA AMP PROB: CPT

## 2021-12-25 PROCEDURE — 76870 US EXAM SCROTUM: CPT

## 2021-12-25 PROCEDURE — 36415 COLL VENOUS BLD VENIPUNCTURE: CPT

## 2021-12-25 PROCEDURE — 87491 CHLMYD TRACH DNA AMP PROBE: CPT

## 2021-12-25 NOTE — ED GU-FEMALE
General


Chief Complaint:   - Reproductive


Stated Complaint:  PER PT NUTS ARE SWOLLEN


Nursing Triage Note:  


PT AMB TO RM 7 W C/O LEFT TESTICLE SWELLING SX TUESDAY. PT ENGAGED IN SEXUAL 


INTERCOURSE LAST NIGHT, REPORTS THE PAIN WAS RELIEVED FOR A SHORT TIME AFTER 


INTERCOURSE, C/O PAIN WORSE W AMBULATION. PT A&OX4.


Source:  patient


Exam Limitations:  no limitations


 (KENNETH DESOUZA)





History of Present Illness


Date Seen by Provider:  Dec 25, 2021


Time Seen by Provider:  15:49


Initial Comments


To ER with left testicular pain that began on Tuesday, 12/21/2021.  No injury.


Timing/Duration:  week


Severity/Quality:  moderate


Location:  groin


Radiation:  none


Activities at Onset:  none


Prior Genitourinary Problems:  none


Associated Symptoms:  denies symptoms (KENNETH DESOUZA)





Allergies and Home Medications


Allergies


Coded Allergies:  


     No Known Drug Allergies (Unverified , 7/15/20)





Patient Home Medication List


Home Medication List Reviewed:  Yes


 (KENNETH DESOUZA)


Dexamethasone (Dexamethasone) 6 Mg Tablet, 6 MG PO DAILY


   Prescribed by: KENNETH BOYCE on 7/30/21 0853


Sulfamethoxazole/Trimethoprim (Bactrim Ds Tablet) 1 Each Tablet, 1 EACH PO BID


   Prescribed by: KENNETH DESOUZA on 12/25/21 1657





Review of Systems


Review of Systems


Constitutional:  see HPI; No chills, No fever


EENTM:  see HPI


Respiratory:  no symptoms reported


Cardiovascular:  no symptoms reported


Gastrointestinal:  No nausea, No vomiting


Genitourinary:  no symptoms reported


Musculoskeletal:  no symptoms reported


Skin:  no symptoms reported


Psychiatric/Neurological:  No Symptoms Reported


Endocrine:  No Symptoms Reported (KENNETH DESOUZA)





Past Medical-Social-Family Hx


Patient Social History


Tobacco Use?:  Yes


Tobacco type used:  Cigarettes


Smoking Status:  Current Everyday Smoker


Use of E-Cig and/or Vaping dev:  No


Substance use?:  Yes


Substance type:  Marijuana


Alcohol Use?:  Yes


Alcohol Frequency:  Once in a while


 (KENNETH DESOUZA)





Immunizations Up To Date


Tetanus Booster (TDap):  Unknown


Influenza Vaccine Up-to-Date:  No; Not Current


First/Initial COVID19 Vaccinat:  NONE


Second COVID19 Vaccination Andrew:  NONE


Third COVID19 Vaccination Date:  NONE


COVID19 Vaccine :  NONE


 (KENNETH DESOUZA)





Seasonal Allergies


Seasonal Allergies:  Yes


 (KENNETH DESOUZA)





Past Medical History


Surgeries:  Yes (LIPMA REMOVAL KNEE)


Orthopedic


Respiratory:  Yes


Asthma


Currently Using CPAP:  No


Currently Using BIPAP:  No


Cardiac:  Yes


Hypertension


Neurological:  No (SEIZURES AS CHILD-NONE SINCE AGE 16)


Seizure Disorder


Reproductive Disorders:  No


Sexually Transmitted Disease:  No


HIV/AIDS:  No


Genitourinary:  No


Gastrointestinal:  No


Musculoskeletal:  Yes


Chronic Back Pain


Endocrine:  No


HEENT:  Yes (GLASSES)


Loss of Vision:  Denies


Hearing Impairment:  Denies


Cancer:  No


Psychosocial:  No


Integumentary:  Yes


Eczema


Blood Disorders:  No


Adverse Reaction/Blood Tranf:  No (N/A)


 (KENNETH DESOUZA)





Family Medical History


No Pertinent Family Hx





Noncontributory


 (KENNETH DESOUZA)





Physical Exam


Vital Signs





Vital Signs - First Documented








 12/25/21





 15:35


 


Temp 36.6


 


Pulse 101


 


Resp 22


 


B/P (MAP) 145/100 (115)


 


Pulse Ox 97


 


O2 Delivery Room Air








 (FAUZIA,MYRON K DO)


Vital Signs


Capillary Refill : Less Than 3 Seconds 


 (KENNETH DESOUZA)


Height, Weight, BMI


Height: 5'7.00"


Weight: 200lbs. oz. 90.508696zf; 34.00 BMI


Method:Estimated


General Appearance:  WD/WN, no apparent distress, other (Anxious)


Neck:  non-tender, full range of motion


Cardiovascular:  no murmur, tachycardia


Respiratory:  no respiratory distress, no accessory muscle use


Gastrointestinal:  normal bowel sounds, non tender, soft


Genital/Rectal:  other (Left testicle is slightly larger than the right and is 

tender to palpation.  The overlying scrotal skin is normal in appearance without

induration, crepitus or abscess.)


Neurologic/Psychiatric:  alert, normal mood/affect, oriented x 3


Skin:  normal color, warm/dry (KENNETH DESOUZA)





Progress/Results/Core Measures


Suspected Sepsis


SIRS


Temperature: 


Pulse: 101 


Respiratory Rate: 22


 


Blood Pressure 145 /100 


Mean: 115


 


 (KENNETH DESOUZA)





Results/Orders


Lab Results





Laboratory Tests








Test


 12/25/21


16:49 Range/Units


 


 


Urine Color YELLOW   


 


Urine Clarity SL CLOUDY   


 


Urine pH 6.0  5-9  


 


Urine Specific Gravity 1.025 H 1.016-1.022  


 


Urine Protein TRACE H NEGATIVE  


 


Urine Glucose (UA) NEGATIVE  NEGATIVE  


 


Urine Ketones NEGATIVE  NEGATIVE  


 


Urine Nitrite NEGATIVE  NEGATIVE  


 


Urine Bilirubin NEGATIVE  NEGATIVE  


 


Urine Urobilinogen 0.2  < = 1.0  MG/DL


 


Urine Leukocyte Esterase 1+ H NEGATIVE  


 


Urine RBC (Auto) NEGATIVE  NEGATIVE  


 


Urine RBC NONE   /HPF


 


Urine WBC 25-50 H  /HPF


 


Urine Squamous Epithelial


Cells RARE 


  /HPF





 


Urine Crystals NONE   /LPF


 


Urine Bacteria TRACE   /HPF


 


Urine Casts NONE   /LPF


 


Urine Mucus SMALL H  /LPF


 


Urine Culture Indicated


 CULTURE


PENDING  








 (MYRON CAGE DO)


Vital Signs/I&O


Capillary Refill : Less Than 3 Seconds 


 (KENNETH DESOUZA)








Blood Pressure Mean:                    115











Departure


Impression





   Primary Impression:  


   Epididymitis


Disposition:  01 HOME, SELF-CARE


Condition:  Stable





Departure-Patient Inst.


Decision time for Depature:  15:51


 (KENNETH DESOUZA)


Referrals:  


YUNI SOOD (PCP/Family)


Primary Care Physician


Patient Instructions:  Epididymitis





Add. Discharge Instructions:  


1.  Elevate the scrotum as much as possible, ice packs to the testicles will 

help with the pain, take ibuprofen in addition to the prescribed pain medic

ation.  Antibiotic as directed.  Follow-up with your doctor next week.





All discharge instructions reviewed with patient and/or family. Voiced 

understanding.


Scripts


Sulfamethoxazole/Trimethoprim (Bactrim Ds Tablet) 1 Each Tablet


1 EACH PO BID, #20 TAB


   Prov: KENNETH DESOUZA         12/25/21








ATTENDING PHYSICIAN NOTE:


I WAS PHYSICALLY PRESENT AS ER PHYSICIAN WHEN THIS PATIENT WAS IN ER, BUT I WAS 

NOT INVOLVED IN ANY DECISION MAKING OR ANY CARE OF THIS PATIENT. 


 (MYRON CAGE DO)











KENNETH DESOUZA             Dec 25, 2021 15:51


MYRON CAGE DO                 Dec 26, 2021 06:03

## 2021-12-25 NOTE — DIAGNOSTIC IMAGING REPORT
EXAM: Testicular/scrotum ultrasound 



DATE: December 25, 2021.



COMPARISON: July 9, 2017. 



INDICATION: 36-year-old male, right testicle pain. 



PROCEDURE: Two-dimensional grayscale , spectral Doppler and color

Doppler ultrasound examination of the testes is performed. 



FINDINGS: Right testicle. The right testicle has normal contour

and is without mass. There is normal blood flow to the right

testicle. The right testicle measures 4.1 cm x 1.8 cm x 2.2 cm. 



Left testicle: The left testicle has normal contour and is

without mass. There is normal blood flow to the left testicle.

The left testicle measures 3.7 cm x 2.1 cm x 2.4 cm. 



There is a 4 x 2 mm epididymal head cyst or spermatocele on the

left. There is mildly increased vascularity of the left

epididymis. The right epididymis is unremarkable in appearance. 



IMPRESSION: 

1. The left epididymis is mildly hypervascular which may reflect

acute epididymitis. No evidence of orchitis.

2. No evidence of testicular torsion.

3. No intratesticular mass.



Dictated by: 



  Dictated on workstation # [id number]

## 2022-03-26 ENCOUNTER — HOSPITAL ENCOUNTER (INPATIENT)
Dept: HOSPITAL 75 - ER | Age: 37
Discharge: LEFT BEFORE DISCHARGE | DRG: 917 | End: 2022-03-26
Attending: INTERNAL MEDICINE | Admitting: INTERNAL MEDICINE
Payer: SELF-PAY

## 2022-03-26 VITALS — SYSTOLIC BLOOD PRESSURE: 136 MMHG | DIASTOLIC BLOOD PRESSURE: 86 MMHG

## 2022-03-26 VITALS — WEIGHT: 229.28 LBS | HEIGHT: 67.01 IN | BODY MASS INDEX: 35.99 KG/M2

## 2022-03-26 DIAGNOSIS — I10: ICD-10-CM

## 2022-03-26 DIAGNOSIS — G89.29: ICD-10-CM

## 2022-03-26 DIAGNOSIS — R09.02: ICD-10-CM

## 2022-03-26 DIAGNOSIS — I21.A1: ICD-10-CM

## 2022-03-26 DIAGNOSIS — G40.909: ICD-10-CM

## 2022-03-26 DIAGNOSIS — F17.210: ICD-10-CM

## 2022-03-26 DIAGNOSIS — T40.5X1A: ICD-10-CM

## 2022-03-26 DIAGNOSIS — M54.9: ICD-10-CM

## 2022-03-26 DIAGNOSIS — T43.621A: Primary | ICD-10-CM

## 2022-03-26 LAB
ALBUMIN SERPL-MCNC: 4.4 GM/DL (ref 3.2–4.5)
ALP SERPL-CCNC: 128 U/L (ref 40–136)
ALT SERPL-CCNC: 752 U/L (ref 0–55)
APAP SERPL-MCNC: < 10 UG/ML (ref 10–30)
APTT BLD: 34 SEC (ref 24–35)
APTT PPP: YELLOW S
ARTERIAL PATENCY WRIST A: (no result)
ARTERIAL PATENCY WRIST A: (no result)
BACTERIA #/AREA URNS HPF: (no result) /HPF
BARBITURATES UR QL: NEGATIVE
BASE EXCESS STD BLDA CALC-SCNC: -2.1 MMOL/L (ref -2.5–2.5)
BASE EXCESS STD BLDA CALC-SCNC: -8 MMOL/L (ref -2.5–2.5)
BASOPHILS # BLD AUTO: 0.2 10^3/UL (ref 0–0.1)
BASOPHILS NFR BLD AUTO: 0 % (ref 0–10)
BDY SITE: (no result)
BDY SITE: (no result)
BENZODIAZ UR QL SCN: NEGATIVE
BILIRUB SERPL-MCNC: 0.2 MG/DL (ref 0.1–1)
BILIRUB UR QL STRIP: NEGATIVE
BODY TEMPERATURE: 30.2
BODY TEMPERATURE: 36
BUN/CREAT SERPL: 9
CALCIUM SERPL-MCNC: 9.6 MG/DL (ref 8.5–10.1)
CHLORIDE SERPL-SCNC: 102 MMOL/L (ref 98–107)
CK SERPL-CCNC: 438 U/L (ref 30–200)
CO2 BLDA CALC-SCNC: 25.8 MMOL/L (ref 21–31)
CO2 BLDA CALC-SCNC: 26.3 MMOL/L (ref 21–31)
CO2 SERPL-SCNC: 19 MMOL/L (ref 21–32)
COCAINE UR QL: POSITIVE
CREAT SERPL-MCNC: 2.15 MG/DL (ref 0.6–1.3)
EOSINOPHIL # BLD AUTO: 0.1 10^3/UL (ref 0–0.3)
EOSINOPHIL NFR BLD AUTO: 0 % (ref 0–10)
EOSINOPHIL NFR BLD MANUAL: 1 %
FIBRINOGEN PPP-MCNC: CLEAR MG/DL
GFR SERPLBLD BASED ON 1.73 SQ M-ARVRAT: 40 ML/MIN
GLUCOSE SERPL-MCNC: 303 MG/DL (ref 70–105)
GLUCOSE UR STRIP-MCNC: (no result) MG/DL
HCT VFR BLD CALC: 45 % (ref 40–54)
HGB BLD-MCNC: 14.2 G/DL (ref 13.3–17.7)
INHALED O2 FLOW RATE: (no result) L/MIN
INHALED O2 FLOW RATE: (no result) L/MIN
INR PPP: 0.8 (ref 0.8–1.4)
KETONES UR QL STRIP: NEGATIVE
LEUKOCYTE ESTERASE UR QL STRIP: NEGATIVE
LYMPHOCYTES # BLD AUTO: 1.9 10^3/UL (ref 1–4)
LYMPHOCYTES NFR BLD AUTO: 5 % (ref 12–44)
MAGNESIUM SERPL-MCNC: 2.6 MG/DL (ref 1.6–2.4)
MANUAL DIFFERENTIAL PERFORMED BLD QL: YES
MCH RBC QN AUTO: 30 PG (ref 25–34)
MCHC RBC AUTO-ENTMCNC: 32 G/DL (ref 32–36)
MCV RBC AUTO: 93 FL (ref 80–99)
METAMYELOCYTES NFR BLD: 4 %
METHADONE UR QL SCN: NEGATIVE
METHAMPHETAMINE SCREEN URINE S: POSITIVE
MONOCYTES # BLD AUTO: 1.6 10^3/UL (ref 0–1)
MONOCYTES NFR BLD AUTO: 4 % (ref 0–12)
MONOCYTES NFR BLD: 7 %
NEUTROPHILS # BLD AUTO: 35.2 10^3/UL (ref 1.8–7.8)
NEUTROPHILS NFR BLD AUTO: 87 % (ref 42–75)
NEUTS BAND NFR BLD MANUAL: 79 %
NEUTS BAND NFR BLD: 4 %
NITRITE UR QL STRIP: NEGATIVE
OPIATES UR QL SCN: NEGATIVE
OXYCODONE UR QL: NEGATIVE
PCO2 BLDA: 53 MMHG (ref 35–45)
PCO2 BLDA: 78 MMHG (ref 35–45)
PH BLDA: 7.04 [PH] (ref 7.37–7.43)
PH BLDA: 7.27 [PH] (ref 7.37–7.43)
PH UR STRIP: 6 [PH] (ref 5–9)
PLATELET # BLD: 387 10^3/UL (ref 130–400)
PMV BLD AUTO: 10.3 FL (ref 9–12.2)
PO2 BLDA: 112 MMHG (ref 79–93)
PO2 BLDA: 22 MMHG (ref 79–93)
POTASSIUM SERPL-SCNC: 5.1 MMOL/L (ref 3.6–5)
PROPOXYPH UR QL: NEGATIVE
PROT SERPL-MCNC: 7.8 GM/DL (ref 6.4–8.2)
PROT UR QL STRIP: (no result)
PROTHROMBIN TIME: 11.9 SEC (ref 12.2–14.7)
RBC #/AREA URNS HPF: (no result) /HPF
RBC MORPH BLD: NORMAL
SALICYLATES SERPL-MCNC: < 5 MG/DL (ref 5–20)
SAO2 % BLDA FROM PO2: 35 % (ref 94–100)
SAO2 % BLDA FROM PO2: 98 % (ref 94–100)
SODIUM SERPL-SCNC: 139 MMOL/L (ref 135–145)
SP GR UR STRIP: 1.02 (ref 1.02–1.02)
SQUAMOUS #/AREA URNS HPF: (no result) /HPF
TRICYCLICS UR QL SCN: NEGATIVE
VARIANT LYMPHS NFR BLD MANUAL: 5 %
VENTILATION MODE VENT: NO
VENTILATION MODE VENT: NO
WBC # BLD AUTO: 40.3 10^3/UL (ref 4.3–11)
WBC #/AREA URNS HPF: (no result) /HPF

## 2022-03-26 PROCEDURE — 83874 ASSAY OF MYOGLOBIN: CPT

## 2022-03-26 PROCEDURE — 85007 BL SMEAR W/DIFF WBC COUNT: CPT

## 2022-03-26 PROCEDURE — 80320 DRUG SCREEN QUANTALCOHOLS: CPT

## 2022-03-26 PROCEDURE — 87081 CULTURE SCREEN ONLY: CPT

## 2022-03-26 PROCEDURE — 81000 URINALYSIS NONAUTO W/SCOPE: CPT

## 2022-03-26 PROCEDURE — 71045 X-RAY EXAM CHEST 1 VIEW: CPT

## 2022-03-26 PROCEDURE — 70450 CT HEAD/BRAIN W/O DYE: CPT

## 2022-03-26 PROCEDURE — 85730 THROMBOPLASTIN TIME PARTIAL: CPT

## 2022-03-26 PROCEDURE — 82550 ASSAY OF CK (CPK): CPT

## 2022-03-26 PROCEDURE — 85610 PROTHROMBIN TIME: CPT

## 2022-03-26 PROCEDURE — 87088 URINE BACTERIA CULTURE: CPT

## 2022-03-26 PROCEDURE — 86141 C-REACTIVE PROTEIN HS: CPT

## 2022-03-26 PROCEDURE — 84484 ASSAY OF TROPONIN QUANT: CPT

## 2022-03-26 PROCEDURE — 36415 COLL VENOUS BLD VENIPUNCTURE: CPT

## 2022-03-26 PROCEDURE — 80329 ANALGESICS NON-OPIOID 1 OR 2: CPT

## 2022-03-26 PROCEDURE — 80306 DRUG TEST PRSMV INSTRMNT: CPT

## 2022-03-26 PROCEDURE — 83735 ASSAY OF MAGNESIUM: CPT

## 2022-03-26 PROCEDURE — 83605 ASSAY OF LACTIC ACID: CPT

## 2022-03-26 PROCEDURE — 93005 ELECTROCARDIOGRAM TRACING: CPT

## 2022-03-26 PROCEDURE — 82805 BLOOD GASES W/O2 SATURATION: CPT

## 2022-03-26 PROCEDURE — 99291 CRITICAL CARE FIRST HOUR: CPT

## 2022-03-26 PROCEDURE — 87040 BLOOD CULTURE FOR BACTERIA: CPT

## 2022-03-26 PROCEDURE — 80053 COMPREHEN METABOLIC PANEL: CPT

## 2022-03-26 PROCEDURE — 36600 WITHDRAWAL OF ARTERIAL BLOOD: CPT

## 2022-03-26 PROCEDURE — 85027 COMPLETE CBC AUTOMATED: CPT

## 2022-03-26 PROCEDURE — 94760 N-INVAS EAR/PLS OXIMETRY 1: CPT

## 2022-03-26 PROCEDURE — 93041 RHYTHM ECG TRACING: CPT
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
## 2022-03-26 NOTE — DIAGNOSTIC IMAGING REPORT
INDICATION:  Unresponsive.  



TECHNIQUE:  Single view chest 11:16 AM.



CORRELATION STUDY:  10/12/2021



FINDINGS: 

Heart size is borderline enlarged with a rounded configuration.

Vasculature overall within normal limits.  

The lungs are clear with no consolidating infiltrate. There is no

significant effusion or pneumothorax. 



IMPRESSION: 

1. Cardiac enlargement, stable. No evidence of overt failure.

Given configuration, the possibility of pericardial effusion

and/or cardiomyopathy not excluded.



Dictated by: 



  Dictated on workstation # [id number]

## 2022-03-26 NOTE — TELE-ICU CONSULT
Progress Note


Given verbal report from ED attending





37 y/o male flash dunresponsive in his car, parked on side of road.


White substance found in car.


Patient given Narcan in field with little response.





Brought to ED. Attempts at venous accesswere difficult.


When an IO was attempted, the patient became responsive.





Patient noted to have bilateral hearing loss





Urine tox positive for cannabanoids, methamphetamine and cocaine.





Creat 2.15


Troponin mildly elevated





PLAN: aggaressive hydration for elevated myoglobin


Hearing loss has been reported with opoid overdoses, treatment is self limited, 

ENT on consult





Focused Exam


Height, Weight, BMI


Height: 5'7.00"


Weight: 200lbs. oz. 90.382835ln; 32.00 BMI


Method:Estimated


Laboratory Tests


3/26/22 10:45








Labs


Labs


Laboratory Tests


3/26/22 10:00: 


Blood Gas Puncture Site LEFT RADIAL, Blood Gas Patient Temperature 30.2, 

Arterial Blood pH 7.04*L, Arterial Blood Partial Pressure CO2 78*H, Arterial 

Blood Partial Pressure O2 22*L, Arterial Blood HCO3 23, Arterial Blood Total CO2

26.3, Arterial Blood Oxygen Saturation 35L, Arterial Blood Base Excess -8.0L, 

Rich Test YES-POS, Blood Gas Ventilator Setting NO, Blood Gas Inspired Oxygen 

25 L


3/26/22 10:16: 


Urine Color YELLOW, Urine Clarity CLEAR, Urine pH 6.0, Urine Specific Gravity 

1.025H, Urine Protein 1+H, Urine Glucose (UA) 3+H, Urine Ketones NEGATIVE, Urine

Nitrite NEGATIVE, Urine Bilirubin NEGATIVE, Urine Urobilinogen 0.2, Urine 

Leukocyte Esterase NEGATIVE, Urine RBC (Auto) 2+H, Urine RBC NONE, Urine WBC 

NONE, Urine Squamous Epithelial Cells RARE, Urine Crystals NONE, Urine Bacteria 

TRACE, Urine Casts PRESENT, Urine Hyaline Casts 10-25H, Urine Mucus NEGATIVE, 

Urine Culture Indicated NO, Urine Opiates Screen NEGATIVE, Urine Oxycodone 

Screen NEGATIVE, Urine Methadone Screen NEGATIVE, Urine Propoxyphene Screen 

NEGATIVE, Urine Barbiturates Screen NEGATIVE, Ur Tricyclic Antidepressants 

Screen NEGATIVE, Urine Phencyclidine Screen NEGATIVE, Urine Amphetamines Screen 

NEGATIVE, Urine Methamphetamines Screen POSITIVEH, Urine Benzodiazepines Screen 

NEGATIVE, Urine Cocaine Screen POSITIVEH, Urine Cannabinoids Screen POSITIVEH


3/26/22 10:45: 


White Blood Count 40.3*H, Red Blood Count 4.78, Hemoglobin 14.2, Hematocrit 45, 

Mean Corpuscular Volume 93, Mean Corpuscular Hemoglobin 30, Mean Corpuscular 

Hemoglobin Concent 32, Red Cell Distribution Width 11.9, Platelet Count 387, 

Mean Platelet Volume 10.3, Immature Granulocyte % (Auto) 3, Neutrophils (%) 

(Auto) 87H, Lymphocytes (%) (Auto) 5L, Monocytes (%) (Auto) 4, Eosinophils (%) 

(Auto) 0, Basophils (%) (Auto) 0, Neutrophils # (Auto) 35.2H, Lymphocytes # 

(Auto) 1.9, Monocytes # (Auto) 1.6H, Eosinophils # (Auto) 0.1, Basophils # 

(Auto) 0.2H, Immature Granulocyte # (Auto) 1.4H, Neutrophils % (Manual) 79, 

Lymphocytes % (Manual) 5, Monocytes % (Manual) 7, Eosinophils % (Manual) 1, 

Metamyelocytes % 4, Band Neutrophils 4, Blood Morphology Comment NORMAL, 

Prothrombin Time 11.9L, INR Comment 0.8, Activated Partial Thromboplast Time 34,

Sodium Level 139, Potassium Level 5.1H, Chloride Level 102, Carbon Dioxide Level

19L, Anion Gap 18H, Blood Urea Nitrogen 19H, Creatinine 2.15H, Estimat 

Glomerular Filtration Rate 40, BUN/Creatinine Ratio 9, Glucose Level 303H, Ca

lcium Level 9.6, Corrected Calcium 9.3, Magnesium Level 2.6H, Total Bilirubin 0

.2, Aspartate Amino Transf (AST/SGOT) 610H, Alanine Aminotransferase (ALT/SGPT) 

752H, Alkaline Phosphatase 128, Total Creatine Kinase 438H, Myoglobin 221.4H, 

Troponin I 0.092H, C-Reactive Protein High Sensitivity 0.22, Total Protein 7.8, 

Albumin 4.4, Salicylates Level < 5.0L, Acetaminophen Level < 10L, Serum Alcohol 

< 10


3/26/22 13:03: 


Blood Gas Puncture Site RIGHT RADIAL, Blood Gas Patient Temperature 36.0, 

Arterial Blood pH 7.27*L, Arterial Blood Partial Pressure CO2 53H, Arterial 

Blood Partial Pressure O2 112H, Arterial Blood HCO3 24, Arterial Blood Total CO2

25.8, Arterial Blood Oxygen Saturation 98, Arterial Blood Base Excess -2.1, 

Rich Test YES-POS, Blood Gas Ventilator Setting NO, Blood Gas Inspired Oxygen 

2L











JASE KRAMER MD            Mar 26, 2022 13:47

## 2022-03-26 NOTE — CONSULTATION-CARDIOLOGY
HPI-Cardiology


Cardiology Consultation:


Date of Consultation


3/26/22


Time Seen by a Provider:  13:45


Date of Admission





Attending Physician


Kalyan Milligan MD


Admitting Physician


[facility]


Consulting Physician


JAGDISH RUIZ MD, MA, FACP, FACC, [facility], CCDS





HPI:


Chief Complaint:


Reason for Card consult: Elevated troponin





35 yo man found unresponsive in the field that is felt to have been due to drug 

overdose. Was in some resp distress; came to ER on high-flow oxygen (per ER 

physician report). His mental status improved with iv Narcan. At this time he is

somnolent but does awaken to command. He denies any cp or palp or syncope or 

shortness of breath or swelling. Does not report any n/v. Continues to fall 

asleep during the interview and is not able to provide a detailed review of 

systems





Review of Systems-Cardiology


Review of Systems


Constitutional:  other (Continues to fall asleep during the interview and is not

able to provide a detailed review of systems. To the degree that ROS could be 

obtained is given above under HPI)





PMH-Social-Family Hx


Patient Social History


2nd Hand Smoke Exposure:  Yes


Alcohol Use?:  No





Immunizations Up To Date


Tetanus Booster (TDap):  Unknown


Date of Influenza Vaccine:  Oct 7, 2019





Past Medical History


PMH


As described under Assessment.





Family Medical History


Family Medical History:  


He is not able to provide a fam history due to marked somnolence at the


time of this exam





Allergies and Home Medications


Allergies


Coded Allergies:  


     No Known Drug Allergies (Unverified , 7/15/20)





Patient Home Medication List


Home Medication List Reviewed:  Yes


Dexamethasone (Dexamethasone) 6 Mg Tablet, 6 MG PO DAILY


   Prescribed by: KENNETH BOYCE on 7/30/21 0853


Sulfamethoxazole/Trimethoprim (Bactrim Ds Tablet) 1 Each Tablet, 1 EACH PO BID


   Prescribed by: KENNETH DESOUZA on 12/25/21 1657





Physical Exam-Cardiology


Physical Exam


Vital Signs/I&O











 3/26/22 3/26/22 3/26/22 3/26/22





 09:36 09:40 13:48 13:51


 


Temp 34.9   35.9


 


Pulse 97  93 86


 


Resp 16   20


 


B/P (MAP) 177/88 (117)   136/86


 


Pulse Ox 96 99  94


 


O2 Delivery Non Rebreather Nasal Cannula  Nasal Cannula


 


O2 Flow Rate 10.00 2.00  2.00


 


    





 3/26/22   





 14:23   


 


Temp 35.9   


 


Pulse 86   


 


Pulse Ox 94   


 


FiO2 28   





Capillary Refill : Less Than 3 Seconds


Constitutional:  well-developed, well-nourished, other (somnolent but awakens 

and appears oriented to place but not to person and place)


HEENT:  PERRL, other (hearing appears preserved at the time of this exam, 

follows commands appropriately), EOMI


Neck:  carotid pulses are 2 + bilaterally, with good upstrokes


Respiratory:  No accessory muscle use; other (fair to good, bilateral air entry)


Cardiovascular:  regular rate-rhythm, S1 and S2, systolic murmur (soft NATHAN at 

card base)


Gastrointestinal:  No tender; soft; No guarding, No rebound; audible bowel 

sounds


Extremities:  No clubbing, No cyanosis, No significant edema


Neurologic/Psychiatric:  other (moves all limbs equally, not able to cooperate 

with a neuro exam)


Skin:  warm/dry; No rash on exposed areas, No ulcerations on exposed areas





Data Review


Labs


Laboratory Tests


3/26/22 10:00: 


Blood Gas Puncture Site LEFT RADIAL, Blood Gas Patient Temperature 30.2, 

Arterial Blood pH 7.04*L, Arterial Blood Partial Pressure CO2 78*H, Arterial 

Blood Partial Pressure O2 22*L, Arterial Blood HCO3 23, Arterial Blood Total CO2

26.3, Arterial Blood Oxygen Saturation 35L, Arterial Blood Base Excess -8.0L, 

Rich Test YES-POS, Blood Gas Ventilator Setting NO, Blood Gas Inspired Oxygen 

25 L


3/26/22 10:16: 


Urine Color YELLOW, Urine Clarity CLEAR, Urine pH 6.0, Urine Specific Gravity 

1.025H, Urine Protein 1+H, Urine Glucose (UA) 3+H, Urine Ketones NEGATIVE, Urine

Nitrite NEGATIVE, Urine Bilirubin NEGATIVE, Urine Urobilinogen 0.2, Urine 

Leukocyte Esterase NEGATIVE, Urine RBC (Auto) 2+H, Urine RBC NONE, Urine WBC 

NONE, Urine Squamous Epithelial Cells RARE, Urine Crystals NONE, Urine Bacteria 

TRACE, Urine Casts PRESENT, Urine Hyaline Casts 10-25H, Urine Mucus NEGATIVE, 

Urine Culture Indicated NO, Urine Opiates Screen NEGATIVE, Urine Oxycodone 

Screen NEGATIVE, Urine Methadone Screen NEGATIVE, Urine Propoxyphene Screen 

NEGATIVE, Urine Barbiturates Screen NEGATIVE, Ur Tricyclic Antidepressants 

Screen NEGATIVE, Urine Phencyclidine Screen NEGATIVE, Urine Amphetamines Screen 

NEGATIVE, Urine Methamphetamines Screen POSITIVEH, Urine Benzodiazepines Screen 

NEGATIVE, Urine Cocaine Screen POSITIVEH, Urine Cannabinoids Screen POSITIVEH


3/26/22 10:45: 


White Blood Count 40.3*H, Red Blood Count 4.78, Hemoglobin 14.2, Hematocrit 45, 

Mean Corpuscular Volume 93, Mean Corpuscular Hemoglobin 30, Mean Corpuscular 

Hemoglobin Concent 32, Red Cell Distribution Width 11.9, Platelet Count 387, 

Mean Platelet Volume 10.3, Immature Granulocyte % (Auto) 3, Neutrophils (%) 

(Auto) 87H, Lymphocytes (%) (Auto) 5L, Monocytes (%) (Auto) 4, Eosinophils (%) 

(Auto) 0, Basophils (%) (Auto) 0, Neutrophils # (Auto) 35.2H, Lymphocytes # 

(Auto) 1.9, Monocytes # (Auto) 1.6H, Eosinophils # (Auto) 0.1, Basophils # 

(Auto) 0.2H, Immature Granulocyte # (Auto) 1.4H, Neutrophils % (Manual) 79, 

Lymphocytes % (Manual) 5, Monocytes % (Manual) 7, Eosinophils % (Manual) 1, 

Metamyelocytes % 4, Band Neutrophils 4, Blood Morphology Comment NORMAL, 

Prothrombin Time 11.9L, INR Comment 0.8, Activated Partial Thromboplast Time 34,

Sodium Level 139, Potassium Level 5.1H, Chloride Level 102, Carbon Dioxide Level

19L, Anion Gap 18H, Blood Urea Nitrogen 19H, Creatinine 2.15H, Estimat 

Glomerular Filtration Rate 40, BUN/Creatinine Ratio 9, Glucose Level 303H, 

Calcium Level 9.6, Corrected Calcium 9.3, Magnesium Level 2.6H, Total Bilirubin 

0.2, Aspartate Amino Transf (AST/SGOT) 610H, Alanine Aminotransferase (ALT/SGPT)

752H, Alkaline Phosphatase 128, Total Creatine Kinase 438H, Myoglobin 221.4H, 

Troponin I 0.092H, C-Reactive Protein High Sensitivity 0.22, Total Protein 7.8, 

Albumin 4.4, Salicylates Level < 5.0L, Acetaminophen Level < 10L, Serum Alcohol 

< 10


3/26/22 13:03: 


Blood Gas Puncture Site RIGHT RADIAL, Blood Gas Patient Temperature 36.0, 

Arterial Blood pH 7.27*L, Arterial Blood Partial Pressure CO2 53H, Arterial 

Blood Partial Pressure O2 112H, Arterial Blood HCO3 24, Arterial Blood Total CO2

25.8, Arterial Blood Oxygen Saturation 98, Arterial Blood Base Excess -2.1, 

Rich Test YES-POS, Blood Gas Ventilator Setting NO, Blood Gas Inspired Oxygen 

2L








A/P-Cardiology


Assessment/Admission Diagnosis





Drug overdose: urine positive for meth, cocaine, and cannabinoids





Mild troponin elevation: likely type 2 MI due to transient hypoxia


- no cp, ECG shows only mild T abnormality that is nonspecific





Discussion and Recomendations





* ASA added


* Echo


* Monitor labs











JAGDISH RUIZ MD FACP FAC CCDS   Mar 26, 2022 14:38

## 2022-03-26 NOTE — DIAGNOSTIC IMAGING REPORT
EXAMINATION: CT head without contrast.



TECHNIQUE: Multiple contiguous axial images were obtained through

the brain without the use of intravenous contrast. All CT scans

use one or more of the following dose optimizing techniques:

automated exposure control, MA and/or KvP adjustment based on

patient size and exam type or iterative reconstruction. 



HISTORY: Unresponsive. Hearing loss.



COMPARISON: 10/12/2021.



FINDINGS: No large acute territorial ischemia, mass, or

hemorrhage. No midline shift or mass effect. The ventricles,

cortical sulci, and basilar cisterns are patent and unremarkable.





The orbits are normal. Retained secretions are seen in the

bilateral maxillary sinuses. Mastoid air cells are clear. No soft

tissue abnormality is seen. No osseus lesions or fractures are

seen.



IMPRESSION:  

1. No large acute territorial ischemia, mass, or hemorrhage.



Dictated by: 



  Dictated on workstation # [id number]

## 2022-03-28 ENCOUNTER — HOSPITAL ENCOUNTER (EMERGENCY)
Dept: HOSPITAL 75 - ER | Age: 37
Discharge: LEFT BEFORE DISCHARGE | End: 2022-03-28
Payer: SELF-PAY

## 2022-03-28 DIAGNOSIS — M25.562: Primary | ICD-10-CM
